# Patient Record
Sex: MALE | Race: OTHER | NOT HISPANIC OR LATINO
[De-identification: names, ages, dates, MRNs, and addresses within clinical notes are randomized per-mention and may not be internally consistent; named-entity substitution may affect disease eponyms.]

---

## 2017-04-05 ENCOUNTER — APPOINTMENT (OUTPATIENT)
Dept: SURGERY | Facility: CLINIC | Age: 52
End: 2017-04-05

## 2017-04-05 VITALS
OXYGEN SATURATION: 98 % | TEMPERATURE: 98 F | HEART RATE: 81 BPM | SYSTOLIC BLOOD PRESSURE: 113 MMHG | DIASTOLIC BLOOD PRESSURE: 72 MMHG | RESPIRATION RATE: 15 BRPM

## 2017-04-05 DIAGNOSIS — K64.8 OTHER HEMORRHOIDS: ICD-10-CM

## 2017-04-05 DIAGNOSIS — K62.89 OTHER SPECIFIED DISEASES OF ANUS AND RECTUM: ICD-10-CM

## 2017-04-07 ENCOUNTER — RESULT REVIEW (OUTPATIENT)
Age: 52
End: 2017-04-07

## 2017-04-08 ENCOUNTER — EMERGENCY (EMERGENCY)
Facility: HOSPITAL | Age: 52
LOS: 1 days | Discharge: HOME | End: 2017-04-08
Attending: EMERGENCY MEDICINE | Admitting: EMERGENCY MEDICINE
Payer: COMMERCIAL

## 2017-04-08 VITALS
TEMPERATURE: 98 F | HEART RATE: 58 BPM | SYSTOLIC BLOOD PRESSURE: 160 MMHG | OXYGEN SATURATION: 100 % | DIASTOLIC BLOOD PRESSURE: 77 MMHG | RESPIRATION RATE: 18 BRPM

## 2017-04-08 VITALS
OXYGEN SATURATION: 100 % | DIASTOLIC BLOOD PRESSURE: 54 MMHG | HEART RATE: 75 BPM | SYSTOLIC BLOOD PRESSURE: 113 MMHG | RESPIRATION RATE: 18 BRPM

## 2017-04-08 LAB
ALBUMIN SERPL ELPH-MCNC: 4.5 G/DL — SIGNIFICANT CHANGE UP (ref 3.3–5)
ALP SERPL-CCNC: 67 U/L — SIGNIFICANT CHANGE UP (ref 40–120)
ALT FLD-CCNC: 13 U/L — SIGNIFICANT CHANGE UP (ref 4–41)
APPEARANCE UR: CLEAR — SIGNIFICANT CHANGE UP
AST SERPL-CCNC: 17 U/L — SIGNIFICANT CHANGE UP (ref 4–40)
BASE EXCESS BLDV CALC-SCNC: 7.3 MMOL/L — SIGNIFICANT CHANGE UP
BILIRUB SERPL-MCNC: 0.4 MG/DL — SIGNIFICANT CHANGE UP (ref 0.2–1.2)
BILIRUB UR-MCNC: NEGATIVE — SIGNIFICANT CHANGE UP
BLOOD GAS VENOUS - CREATININE: 1.38 MG/DL — HIGH (ref 0.5–1.3)
BLOOD UR QL VISUAL: HIGH
BUN SERPL-MCNC: 19 MG/DL — SIGNIFICANT CHANGE UP (ref 7–23)
BUN SERPL-MCNC: 21 MG/DL — SIGNIFICANT CHANGE UP (ref 7–23)
CALCIUM SERPL-MCNC: 7.8 MG/DL — LOW (ref 8.4–10.5)
CALCIUM SERPL-MCNC: 9.6 MG/DL — SIGNIFICANT CHANGE UP (ref 8.4–10.5)
CHLORIDE BLDV-SCNC: 110 MMOL/L — HIGH (ref 96–108)
CHLORIDE SERPL-SCNC: 102 MMOL/L — SIGNIFICANT CHANGE UP (ref 98–107)
CHLORIDE SERPL-SCNC: 108 MMOL/L — HIGH (ref 98–107)
CO2 SERPL-SCNC: 22 MMOL/L — SIGNIFICANT CHANGE UP (ref 22–31)
CO2 SERPL-SCNC: 28 MMOL/L — SIGNIFICANT CHANGE UP (ref 22–31)
COLOR SPEC: SIGNIFICANT CHANGE UP
CREAT SERPL-MCNC: 1.23 MG/DL — SIGNIFICANT CHANGE UP (ref 0.5–1.3)
CREAT SERPL-MCNC: 1.47 MG/DL — HIGH (ref 0.5–1.3)
GAS PNL BLDV: 137 MMOL/L — SIGNIFICANT CHANGE UP (ref 136–146)
GLUCOSE BLDV-MCNC: 112 — HIGH (ref 70–99)
GLUCOSE SERPL-MCNC: 113 MG/DL — HIGH (ref 70–99)
GLUCOSE SERPL-MCNC: 118 MG/DL — HIGH (ref 70–99)
GLUCOSE UR-MCNC: NEGATIVE — SIGNIFICANT CHANGE UP
HCO3 BLDV-SCNC: 29 MMOL/L — HIGH (ref 20–27)
HCT VFR BLD CALC: 45.5 % — SIGNIFICANT CHANGE UP (ref 39–50)
HCT VFR BLDV CALC: 49 % — SIGNIFICANT CHANGE UP (ref 39–51)
HGB BLD-MCNC: 15.6 G/DL — SIGNIFICANT CHANGE UP (ref 13–17)
HGB BLDV-MCNC: 16 G/DL — SIGNIFICANT CHANGE UP (ref 13–17)
KETONES UR-MCNC: SIGNIFICANT CHANGE UP
LACTATE BLDV-MCNC: 1.9 MMOL/L — SIGNIFICANT CHANGE UP (ref 0.5–2)
LEUKOCYTE ESTERASE UR-ACNC: NEGATIVE — SIGNIFICANT CHANGE UP
MCHC RBC-ENTMCNC: 30.2 PG — SIGNIFICANT CHANGE UP (ref 27–34)
MCHC RBC-ENTMCNC: 34.3 % — SIGNIFICANT CHANGE UP (ref 32–36)
MCV RBC AUTO: 88 FL — SIGNIFICANT CHANGE UP (ref 80–100)
MUCOUS THREADS # UR AUTO: SIGNIFICANT CHANGE UP
NITRITE UR-MCNC: NEGATIVE — SIGNIFICANT CHANGE UP
PCO2 BLDV: 54 MMHG — HIGH (ref 41–51)
PH BLDV: 7.39 PH — SIGNIFICANT CHANGE UP (ref 7.32–7.43)
PH UR: 7 — SIGNIFICANT CHANGE UP (ref 4.6–8)
PLATELET # BLD AUTO: 176 K/UL — SIGNIFICANT CHANGE UP (ref 150–400)
PMV BLD: 11.5 FL — SIGNIFICANT CHANGE UP (ref 7–13)
PO2 BLDV: 31 MMHG — LOW (ref 35–40)
POTASSIUM BLDV-SCNC: 3.5 MMOL/L — SIGNIFICANT CHANGE UP (ref 3.4–4.5)
POTASSIUM SERPL-MCNC: 4 MMOL/L — SIGNIFICANT CHANGE UP (ref 3.5–5.3)
POTASSIUM SERPL-MCNC: 4.4 MMOL/L — SIGNIFICANT CHANGE UP (ref 3.5–5.3)
POTASSIUM SERPL-SCNC: 4 MMOL/L — SIGNIFICANT CHANGE UP (ref 3.5–5.3)
POTASSIUM SERPL-SCNC: 4.4 MMOL/L — SIGNIFICANT CHANGE UP (ref 3.5–5.3)
PROT SERPL-MCNC: 7.3 G/DL — SIGNIFICANT CHANGE UP (ref 6–8.3)
PROT UR-MCNC: NEGATIVE — SIGNIFICANT CHANGE UP
RBC # BLD: 5.17 M/UL — SIGNIFICANT CHANGE UP (ref 4.2–5.8)
RBC # FLD: 12.2 % — SIGNIFICANT CHANGE UP (ref 10.3–14.5)
RBC CASTS # UR COMP ASSIST: >50 — HIGH (ref 0–?)
SAO2 % BLDV: 58.2 % — LOW (ref 60–85)
SODIUM SERPL-SCNC: 143 MMOL/L — SIGNIFICANT CHANGE UP (ref 135–145)
SODIUM SERPL-SCNC: 145 MMOL/L — SIGNIFICANT CHANGE UP (ref 135–145)
SP GR SPEC: 1.02 — SIGNIFICANT CHANGE UP (ref 1–1.03)
SQUAMOUS # UR AUTO: SIGNIFICANT CHANGE UP
UROBILINOGEN FLD QL: NORMAL E.U. — SIGNIFICANT CHANGE UP (ref 0.1–0.2)
WBC # BLD: 11.51 K/UL — HIGH (ref 3.8–10.5)
WBC # FLD AUTO: 11.51 K/UL — HIGH (ref 3.8–10.5)
WBC UR QL: SIGNIFICANT CHANGE UP (ref 0–?)

## 2017-04-08 PROCEDURE — 99284 EMERGENCY DEPT VISIT MOD MDM: CPT

## 2017-04-08 PROCEDURE — 88300 SURGICAL PATH GROSS: CPT | Mod: 26

## 2017-04-08 PROCEDURE — 74176 CT ABD & PELVIS W/O CONTRAST: CPT | Mod: 26,GC

## 2017-04-08 RX ORDER — HYDROMORPHONE HYDROCHLORIDE 2 MG/ML
1 INJECTION INTRAMUSCULAR; INTRAVENOUS; SUBCUTANEOUS ONCE
Qty: 0 | Refills: 0 | Status: DISCONTINUED | OUTPATIENT
Start: 2017-04-08 | End: 2017-04-08

## 2017-04-08 RX ORDER — ONDANSETRON 8 MG/1
4 TABLET, FILM COATED ORAL ONCE
Qty: 0 | Refills: 0 | Status: COMPLETED | OUTPATIENT
Start: 2017-04-08 | End: 2017-04-08

## 2017-04-08 RX ORDER — SODIUM CHLORIDE 9 MG/ML
1000 INJECTION INTRAMUSCULAR; INTRAVENOUS; SUBCUTANEOUS ONCE
Qty: 0 | Refills: 0 | Status: COMPLETED | OUTPATIENT
Start: 2017-04-08 | End: 2017-04-08

## 2017-04-08 RX ORDER — TAMSULOSIN HYDROCHLORIDE 0.4 MG/1
0.4 CAPSULE ORAL ONCE
Qty: 0 | Refills: 0 | Status: COMPLETED | OUTPATIENT
Start: 2017-04-08 | End: 2017-04-08

## 2017-04-08 RX ORDER — KETOROLAC TROMETHAMINE 30 MG/ML
30 SYRINGE (ML) INJECTION ONCE
Qty: 0 | Refills: 0 | Status: DISCONTINUED | OUTPATIENT
Start: 2017-04-08 | End: 2017-04-08

## 2017-04-08 RX ORDER — MORPHINE SULFATE 50 MG/1
2 CAPSULE, EXTENDED RELEASE ORAL ONCE
Qty: 0 | Refills: 0 | Status: DISCONTINUED | OUTPATIENT
Start: 2017-04-08 | End: 2017-04-08

## 2017-04-08 RX ADMIN — TAMSULOSIN HYDROCHLORIDE 0.4 MILLIGRAM(S): 0.4 CAPSULE ORAL at 19:18

## 2017-04-08 RX ADMIN — ONDANSETRON 4 MILLIGRAM(S): 8 TABLET, FILM COATED ORAL at 16:14

## 2017-04-08 RX ADMIN — MORPHINE SULFATE 2 MILLIGRAM(S): 50 CAPSULE, EXTENDED RELEASE ORAL at 17:36

## 2017-04-08 RX ADMIN — MORPHINE SULFATE 2 MILLIGRAM(S): 50 CAPSULE, EXTENDED RELEASE ORAL at 17:55

## 2017-04-08 RX ADMIN — HYDROMORPHONE HYDROCHLORIDE 1 MILLIGRAM(S): 2 INJECTION INTRAMUSCULAR; INTRAVENOUS; SUBCUTANEOUS at 18:30

## 2017-04-08 RX ADMIN — Medication 30 MILLIGRAM(S): at 16:35

## 2017-04-08 RX ADMIN — Medication 30 MILLIGRAM(S): at 16:14

## 2017-04-08 RX ADMIN — SODIUM CHLORIDE 2000 MILLILITER(S): 9 INJECTION INTRAMUSCULAR; INTRAVENOUS; SUBCUTANEOUS at 16:14

## 2017-04-08 RX ADMIN — SODIUM CHLORIDE 2000 MILLILITER(S): 9 INJECTION INTRAMUSCULAR; INTRAVENOUS; SUBCUTANEOUS at 18:30

## 2017-04-08 RX ADMIN — HYDROMORPHONE HYDROCHLORIDE 1 MILLIGRAM(S): 2 INJECTION INTRAMUSCULAR; INTRAVENOUS; SUBCUTANEOUS at 18:10

## 2017-04-08 NOTE — ED PROVIDER NOTE - MEDICAL DECISION MAKING DETAILS
Pt with acute onset R flank pain radiating to groin, suspect nephrolithiasis, will give toradol, zofran, check CBC, BMP, UA, CT abd renal stone protocol, IVF

## 2017-04-08 NOTE — ED PROVIDER NOTE - OBJECTIVE STATEMENT
53 yo M hx nephrolithiasis, BPH, p/w acute onset R sided flank pain radiating to groin. Has associated sxs of nausea 51 yo M hx nephrolithiasis, BPH, p/w acute onset R sided flank pain radiating to groin. 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 Has associated sxs of nausea, but no vomiting. Symptoms feel similar to his prior episodes of nephrolithiasis. No fevers/chills. No dysuria.

## 2017-04-08 NOTE — ED PROVIDER NOTE - PROGRESS NOTE DETAILS
Pt passed kidney stone, feeling better, will d/c home with outpatient f/u with Urologist pt asking to be discharged. Does not want any meds for home. no pain now. no nausea/vomit. does not want to wait for repeat BMP will follow up himself. pt will see his urologist this week. pt pain free now.

## 2017-04-08 NOTE — ED PROVIDER NOTE - ATTENDING CONTRIBUTION TO CARE
DR Bloch- Patient c/o a few days of right groin pain, c/o severe right flank pain and nausea, similar to prior stones. Well appearing , NAD HEENT nml neck supple mod distress, mild tight lower abd pain, no hernia palpated, testiculr exam supervise by DR Lim- normal lie initially mild tenderness, but then no tenderness,

## 2017-05-03 LAB — SURGICAL PATHOLOGY STUDY: SIGNIFICANT CHANGE UP

## 2017-06-01 ENCOUNTER — FORM ENCOUNTER (OUTPATIENT)
Age: 52
End: 2017-06-01

## 2017-06-02 ENCOUNTER — OUTPATIENT (OUTPATIENT)
Dept: OUTPATIENT SERVICES | Facility: HOSPITAL | Age: 52
LOS: 1 days | End: 2017-06-02
Payer: COMMERCIAL

## 2017-06-02 ENCOUNTER — APPOINTMENT (OUTPATIENT)
Dept: ULTRASOUND IMAGING | Facility: CLINIC | Age: 52
End: 2017-06-02

## 2017-06-02 DIAGNOSIS — N50.819 TESTICULAR PAIN, UNSPECIFIED: ICD-10-CM

## 2017-06-02 PROCEDURE — 76870 US EXAM SCROTUM: CPT

## 2017-12-04 ENCOUNTER — APPOINTMENT (OUTPATIENT)
Dept: INFECTIOUS DISEASE | Facility: CLINIC | Age: 52
End: 2017-12-04
Payer: COMMERCIAL

## 2017-12-04 VITALS
RESPIRATION RATE: 18 BRPM | BODY MASS INDEX: 22.43 KG/M2 | HEIGHT: 68 IN | SYSTOLIC BLOOD PRESSURE: 136 MMHG | WEIGHT: 148 LBS | TEMPERATURE: 98.4 F | OXYGEN SATURATION: 100 % | DIASTOLIC BLOOD PRESSURE: 81 MMHG | HEART RATE: 100 BPM

## 2017-12-04 DIAGNOSIS — R76.11 NONSPECIFIC REACTION TO TUBERCULIN SKIN TEST W/OUT ACTIVE TUBERCULOSIS: ICD-10-CM

## 2017-12-04 PROCEDURE — 99203 OFFICE O/P NEW LOW 30 MIN: CPT

## 2017-12-08 ENCOUNTER — APPOINTMENT (OUTPATIENT)
Dept: INFECTIOUS DISEASE | Facility: CLINIC | Age: 52
End: 2017-12-08

## 2017-12-15 ENCOUNTER — APPOINTMENT (OUTPATIENT)
Dept: INFECTIOUS DISEASE | Facility: CLINIC | Age: 52
End: 2017-12-15

## 2017-12-22 ENCOUNTER — APPOINTMENT (OUTPATIENT)
Dept: INFECTIOUS DISEASE | Facility: CLINIC | Age: 52
End: 2017-12-22

## 2017-12-29 ENCOUNTER — APPOINTMENT (OUTPATIENT)
Dept: INFECTIOUS DISEASE | Facility: CLINIC | Age: 52
End: 2017-12-29

## 2018-01-02 ENCOUNTER — APPOINTMENT (OUTPATIENT)
Dept: INFECTIOUS DISEASE | Facility: CLINIC | Age: 53
End: 2018-01-02
Payer: COMMERCIAL

## 2018-01-02 VITALS
WEIGHT: 147 LBS | SYSTOLIC BLOOD PRESSURE: 124 MMHG | DIASTOLIC BLOOD PRESSURE: 69 MMHG | BODY MASS INDEX: 22.28 KG/M2 | RESPIRATION RATE: 16 BRPM | HEART RATE: 72 BPM | TEMPERATURE: 98 F | HEIGHT: 68 IN | OXYGEN SATURATION: 100 %

## 2018-01-02 LAB
BASOPHILS # BLD AUTO: 0.07 K/UL
BASOPHILS NFR BLD AUTO: 1.1 %
EOSINOPHIL # BLD AUTO: 0.27 K/UL
EOSINOPHIL NFR BLD AUTO: 4.2 %
HCT VFR BLD CALC: 43.1 %
HGB BLD-MCNC: 14.4 G/DL
IMM GRANULOCYTES NFR BLD AUTO: 0.2 %
LYMPHOCYTES # BLD AUTO: 2.96 K/UL
LYMPHOCYTES NFR BLD AUTO: 46.3 %
MAN DIFF?: NORMAL
MCHC RBC-ENTMCNC: 29.8 PG
MCHC RBC-ENTMCNC: 33.4 GM/DL
MCV RBC AUTO: 89 FL
MONOCYTES # BLD AUTO: 0.62 K/UL
MONOCYTES NFR BLD AUTO: 9.7 %
NEUTROPHILS # BLD AUTO: 2.47 K/UL
NEUTROPHILS NFR BLD AUTO: 38.5 %
PLATELET # BLD AUTO: 210 K/UL
RBC # BLD: 4.84 M/UL
RBC # FLD: 12.9 %
WBC # FLD AUTO: 6.4 K/UL

## 2018-01-02 PROCEDURE — 99213 OFFICE O/P EST LOW 20 MIN: CPT

## 2018-01-05 ENCOUNTER — APPOINTMENT (OUTPATIENT)
Dept: INFECTIOUS DISEASE | Facility: CLINIC | Age: 53
End: 2018-01-05

## 2018-01-08 LAB
ALBUMIN SERPL ELPH-MCNC: 3.9 G/DL
ALP BLD-CCNC: 49 U/L
ALT SERPL-CCNC: 29 U/L
ANION GAP SERPL CALC-SCNC: 12 MMOL/L
AST SERPL-CCNC: 23 U/L
BILIRUB SERPL-MCNC: 0.4 MG/DL
BUN SERPL-MCNC: 18 MG/DL
CALCIUM SERPL-MCNC: 9.3 MG/DL
CHLORIDE SERPL-SCNC: 105 MMOL/L
CO2 SERPL-SCNC: 26 MMOL/L
CREAT SERPL-MCNC: 1.04 MG/DL
GLUCOSE SERPL-MCNC: 116 MG/DL
POTASSIUM SERPL-SCNC: 4.2 MMOL/L
PROT SERPL-MCNC: 6.8 G/DL
SODIUM SERPL-SCNC: 143 MMOL/L

## 2018-01-12 ENCOUNTER — APPOINTMENT (OUTPATIENT)
Dept: INFECTIOUS DISEASE | Facility: CLINIC | Age: 53
End: 2018-01-12

## 2018-01-19 ENCOUNTER — APPOINTMENT (OUTPATIENT)
Dept: INFECTIOUS DISEASE | Facility: CLINIC | Age: 53
End: 2018-01-19

## 2018-01-26 ENCOUNTER — APPOINTMENT (OUTPATIENT)
Dept: INFECTIOUS DISEASE | Facility: CLINIC | Age: 53
End: 2018-01-26

## 2018-02-02 ENCOUNTER — APPOINTMENT (OUTPATIENT)
Dept: INFECTIOUS DISEASE | Facility: CLINIC | Age: 53
End: 2018-02-02

## 2018-02-06 ENCOUNTER — APPOINTMENT (OUTPATIENT)
Dept: INFECTIOUS DISEASE | Facility: CLINIC | Age: 53
End: 2018-02-06
Payer: COMMERCIAL

## 2018-02-06 VITALS
DIASTOLIC BLOOD PRESSURE: 65 MMHG | HEART RATE: 66 BPM | TEMPERATURE: 97.8 F | WEIGHT: 147 LBS | SYSTOLIC BLOOD PRESSURE: 113 MMHG | OXYGEN SATURATION: 100 % | BODY MASS INDEX: 22.35 KG/M2

## 2018-02-06 PROCEDURE — 99213 OFFICE O/P EST LOW 20 MIN: CPT

## 2018-02-06 RX ORDER — ISONIAZID 300 MG/1
300 TABLET ORAL
Qty: 12 | Refills: 2 | Status: DISCONTINUED | COMMUNITY
Start: 2017-12-04 | End: 2018-02-06

## 2018-02-06 RX ORDER — RIFAPENTINE 150 MG/1
150 TABLET, FILM COATED ORAL
Qty: 24 | Refills: 2 | Status: DISCONTINUED | COMMUNITY
Start: 2017-12-04 | End: 2018-02-06

## 2018-02-09 ENCOUNTER — APPOINTMENT (OUTPATIENT)
Dept: INFECTIOUS DISEASE | Facility: CLINIC | Age: 53
End: 2018-02-09

## 2018-02-16 ENCOUNTER — APPOINTMENT (OUTPATIENT)
Dept: INFECTIOUS DISEASE | Facility: CLINIC | Age: 53
End: 2018-02-16

## 2018-02-23 ENCOUNTER — APPOINTMENT (OUTPATIENT)
Dept: INFECTIOUS DISEASE | Facility: CLINIC | Age: 53
End: 2018-02-23

## 2018-02-26 LAB
ALBUMIN SERPL ELPH-MCNC: 4.1 G/DL
ALP BLD-CCNC: 59 U/L
ALT SERPL-CCNC: 26 U/L
ANION GAP SERPL CALC-SCNC: 11 MMOL/L
AST SERPL-CCNC: 26 U/L
BASOPHILS # BLD AUTO: 0.06 K/UL
BASOPHILS NFR BLD AUTO: 1 %
BILIRUB SERPL-MCNC: 0.4 MG/DL
BUN SERPL-MCNC: 22 MG/DL
CALCIUM SERPL-MCNC: 9.7 MG/DL
CHLORIDE SERPL-SCNC: 105 MMOL/L
CO2 SERPL-SCNC: 26 MMOL/L
CREAT SERPL-MCNC: 1.01 MG/DL
EOSINOPHIL # BLD AUTO: 0.32 K/UL
EOSINOPHIL NFR BLD AUTO: 5.5 %
GLUCOSE SERPL-MCNC: 124 MG/DL
HCT VFR BLD CALC: 43 %
HGB BLD-MCNC: 14.7 G/DL
IMM GRANULOCYTES NFR BLD AUTO: 0 %
LYMPHOCYTES # BLD AUTO: 2.47 K/UL
LYMPHOCYTES NFR BLD AUTO: 42.1 %
MAN DIFF?: NORMAL
MCHC RBC-ENTMCNC: 30.2 PG
MCHC RBC-ENTMCNC: 34.2 GM/DL
MCV RBC AUTO: 88.5 FL
MONOCYTES # BLD AUTO: 0.41 K/UL
MONOCYTES NFR BLD AUTO: 7 %
NEUTROPHILS # BLD AUTO: 2.61 K/UL
NEUTROPHILS NFR BLD AUTO: 44.4 %
PLATELET # BLD AUTO: 164 K/UL
POTASSIUM SERPL-SCNC: 4.5 MMOL/L
PROT SERPL-MCNC: 6.7 G/DL
RBC # BLD: 4.86 M/UL
RBC # FLD: 13 %
SODIUM SERPL-SCNC: 142 MMOL/L
WBC # FLD AUTO: 5.87 K/UL

## 2018-03-09 ENCOUNTER — APPOINTMENT (OUTPATIENT)
Dept: INFECTIOUS DISEASE | Facility: CLINIC | Age: 53
End: 2018-03-09
Payer: COMMERCIAL

## 2018-03-09 VITALS
HEIGHT: 68 IN | TEMPERATURE: 97.4 F | OXYGEN SATURATION: 100 % | SYSTOLIC BLOOD PRESSURE: 117 MMHG | HEART RATE: 64 BPM | WEIGHT: 149 LBS | BODY MASS INDEX: 22.58 KG/M2 | DIASTOLIC BLOOD PRESSURE: 65 MMHG

## 2018-03-09 PROCEDURE — 99213 OFFICE O/P EST LOW 20 MIN: CPT

## 2018-04-13 ENCOUNTER — APPOINTMENT (OUTPATIENT)
Dept: INFECTIOUS DISEASE | Facility: CLINIC | Age: 53
End: 2018-04-13
Payer: COMMERCIAL

## 2018-04-13 VITALS
TEMPERATURE: 97.7 F | WEIGHT: 149 LBS | RESPIRATION RATE: 18 BRPM | BODY MASS INDEX: 22.58 KG/M2 | SYSTOLIC BLOOD PRESSURE: 114 MMHG | DIASTOLIC BLOOD PRESSURE: 69 MMHG | HEART RATE: 66 BPM | OXYGEN SATURATION: 97 % | HEIGHT: 68 IN

## 2018-04-13 PROCEDURE — 99213 OFFICE O/P EST LOW 20 MIN: CPT

## 2018-04-16 ENCOUNTER — MOBILE ON CALL (OUTPATIENT)
Age: 53
End: 2018-04-16

## 2018-04-16 LAB
ALBUMIN SERPL ELPH-MCNC: 4.2 G/DL
ALP BLD-CCNC: 65 U/L
ALT SERPL-CCNC: 13 U/L
ANION GAP SERPL CALC-SCNC: 12 MMOL/L
AST SERPL-CCNC: 15 U/L
BASOPHILS # BLD AUTO: 0.05 K/UL
BASOPHILS NFR BLD AUTO: 0.8 %
BILIRUB SERPL-MCNC: 0.3 MG/DL
BUN SERPL-MCNC: 19 MG/DL
CALCIUM SERPL-MCNC: 9.4 MG/DL
CHLORIDE SERPL-SCNC: 102 MMOL/L
CO2 SERPL-SCNC: 28 MMOL/L
CREAT SERPL-MCNC: 1.15 MG/DL
EOSINOPHIL # BLD AUTO: 0.36 K/UL
EOSINOPHIL NFR BLD AUTO: 5.7 %
GLUCOSE SERPL-MCNC: 97 MG/DL
HCT VFR BLD CALC: 43.5 %
HGB BLD-MCNC: 14.8 G/DL
IMM GRANULOCYTES NFR BLD AUTO: 0.2 %
LYMPHOCYTES # BLD AUTO: 2.8 K/UL
LYMPHOCYTES NFR BLD AUTO: 44.2 %
MAN DIFF?: NORMAL
MCHC RBC-ENTMCNC: 30.1 PG
MCHC RBC-ENTMCNC: 34 GM/DL
MCV RBC AUTO: 88.6 FL
MONOCYTES # BLD AUTO: 0.32 K/UL
MONOCYTES NFR BLD AUTO: 5.1 %
NEUTROPHILS # BLD AUTO: 2.79 K/UL
NEUTROPHILS NFR BLD AUTO: 44 %
PLATELET # BLD AUTO: 145 K/UL
POTASSIUM SERPL-SCNC: 4.4 MMOL/L
PROT SERPL-MCNC: 6.8 G/DL
RBC # BLD: 4.91 M/UL
RBC # FLD: 13.1 %
SODIUM SERPL-SCNC: 142 MMOL/L
WBC # FLD AUTO: 6.33 K/UL

## 2018-04-20 ENCOUNTER — APPOINTMENT (OUTPATIENT)
Dept: INFECTIOUS DISEASE | Facility: CLINIC | Age: 53
End: 2018-04-20

## 2018-04-20 LAB
BASOPHILS # BLD AUTO: 0.04 K/UL
BASOPHILS NFR BLD AUTO: 0.7 %
EOSINOPHIL # BLD AUTO: 0.4 K/UL
EOSINOPHIL NFR BLD AUTO: 6.6 %
HCT VFR BLD CALC: 44.8 %
HGB BLD-MCNC: 15.5 G/DL
IMM GRANULOCYTES NFR BLD AUTO: 0.2 %
LYMPHOCYTES # BLD AUTO: 2.68 K/UL
LYMPHOCYTES NFR BLD AUTO: 44.5 %
MAN DIFF?: NORMAL
MCHC RBC-ENTMCNC: 30.5 PG
MCHC RBC-ENTMCNC: 34.6 GM/DL
MCV RBC AUTO: 88.2 FL
MONOCYTES # BLD AUTO: 0.4 K/UL
MONOCYTES NFR BLD AUTO: 6.6 %
NEUTROPHILS # BLD AUTO: 2.49 K/UL
NEUTROPHILS NFR BLD AUTO: 41.4 %
PLATELET # BLD AUTO: 150 K/UL
RBC # BLD: 5.08 M/UL
RBC # FLD: 12.9 %
WBC # FLD AUTO: 6.02 K/UL

## 2018-05-10 ENCOUNTER — OUTPATIENT (OUTPATIENT)
Dept: OUTPATIENT SERVICES | Facility: HOSPITAL | Age: 53
LOS: 1 days | End: 2018-05-10
Payer: COMMERCIAL

## 2018-05-10 ENCOUNTER — APPOINTMENT (OUTPATIENT)
Dept: ULTRASOUND IMAGING | Facility: CLINIC | Age: 53
End: 2018-05-10
Payer: COMMERCIAL

## 2018-05-10 DIAGNOSIS — Z00.8 ENCOUNTER FOR OTHER GENERAL EXAMINATION: ICD-10-CM

## 2018-05-10 PROCEDURE — 76775 US EXAM ABDO BACK WALL LIM: CPT | Mod: 26

## 2018-05-10 PROCEDURE — 76870 US EXAM SCROTUM: CPT

## 2018-05-10 PROCEDURE — 76870 US EXAM SCROTUM: CPT | Mod: 26

## 2018-05-10 PROCEDURE — 76775 US EXAM ABDO BACK WALL LIM: CPT

## 2018-05-22 ENCOUNTER — APPOINTMENT (OUTPATIENT)
Dept: INFECTIOUS DISEASE | Facility: CLINIC | Age: 53
End: 2018-05-22
Payer: COMMERCIAL

## 2018-05-22 PROCEDURE — 99213 OFFICE O/P EST LOW 20 MIN: CPT

## 2018-06-08 ENCOUNTER — APPOINTMENT (OUTPATIENT)
Dept: INFECTIOUS DISEASE | Facility: CLINIC | Age: 53
End: 2018-06-08
Payer: COMMERCIAL

## 2018-06-08 VITALS
TEMPERATURE: 97.9 F | SYSTOLIC BLOOD PRESSURE: 123 MMHG | OXYGEN SATURATION: 99 % | WEIGHT: 146 LBS | BODY MASS INDEX: 22.13 KG/M2 | HEART RATE: 80 BPM | DIASTOLIC BLOOD PRESSURE: 74 MMHG | RESPIRATION RATE: 18 BRPM | HEIGHT: 68 IN

## 2018-06-08 LAB
BASOPHILS # BLD AUTO: 0.07 K/UL
BASOPHILS NFR BLD AUTO: 1.1 %
EOSINOPHIL # BLD AUTO: 0.3 K/UL
EOSINOPHIL NFR BLD AUTO: 4.7 %
HCT VFR BLD CALC: 46.2 %
HGB BLD-MCNC: 15.5 G/DL
IMM GRANULOCYTES NFR BLD AUTO: 0.2 %
LYMPHOCYTES # BLD AUTO: 2.7 K/UL
LYMPHOCYTES NFR BLD AUTO: 42.7 %
MAN DIFF?: NORMAL
MCHC RBC-ENTMCNC: 30.4 PG
MCHC RBC-ENTMCNC: 33.5 GM/DL
MCV RBC AUTO: 90.6 FL
MONOCYTES # BLD AUTO: 0.33 K/UL
MONOCYTES NFR BLD AUTO: 5.2 %
NEUTROPHILS # BLD AUTO: 2.91 K/UL
NEUTROPHILS NFR BLD AUTO: 46.1 %
PLATELET # BLD AUTO: 160 K/UL
RBC # BLD: 5.1 M/UL
RBC # FLD: 12.5 %
WBC # FLD AUTO: 6.32 K/UL

## 2018-06-08 PROCEDURE — 99213 OFFICE O/P EST LOW 20 MIN: CPT

## 2018-06-11 LAB
ALBUMIN SERPL ELPH-MCNC: 4.3 G/DL
ALP BLD-CCNC: 60 U/L
ALT SERPL-CCNC: 11 U/L
ANION GAP SERPL CALC-SCNC: 12 MMOL/L
AST SERPL-CCNC: 15 U/L
BILIRUB SERPL-MCNC: 0.4 MG/DL
BUN SERPL-MCNC: 21 MG/DL
CALCIUM SERPL-MCNC: 9.4 MG/DL
CHLORIDE SERPL-SCNC: 103 MMOL/L
CO2 SERPL-SCNC: 28 MMOL/L
CREAT SERPL-MCNC: 1.07 MG/DL
GLUCOSE SERPL-MCNC: 125 MG/DL
POTASSIUM SERPL-SCNC: 4.4 MMOL/L
PROT SERPL-MCNC: 7 G/DL
SODIUM SERPL-SCNC: 143 MMOL/L

## 2018-06-19 ENCOUNTER — RX RENEWAL (OUTPATIENT)
Age: 53
End: 2018-06-19

## 2019-06-07 PROBLEM — N20.0 CALCULUS OF KIDNEY: Chronic | Status: ACTIVE | Noted: 2017-04-08

## 2019-06-07 PROBLEM — N40.0 BENIGN PROSTATIC HYPERPLASIA WITHOUT LOWER URINARY TRACT SYMPTOMS: Chronic | Status: ACTIVE | Noted: 2017-04-08

## 2019-06-14 ENCOUNTER — OUTPATIENT (OUTPATIENT)
Dept: OUTPATIENT SERVICES | Facility: HOSPITAL | Age: 54
LOS: 1 days | End: 2019-06-14
Payer: COMMERCIAL

## 2019-06-14 ENCOUNTER — APPOINTMENT (OUTPATIENT)
Dept: CT IMAGING | Facility: IMAGING CENTER | Age: 54
End: 2019-06-14
Payer: COMMERCIAL

## 2019-06-14 DIAGNOSIS — Z00.8 ENCOUNTER FOR OTHER GENERAL EXAMINATION: ICD-10-CM

## 2019-06-14 PROCEDURE — 74178 CT ABD&PLV WO CNTR FLWD CNTR: CPT | Mod: 26

## 2019-06-14 PROCEDURE — 74178 CT ABD&PLV WO CNTR FLWD CNTR: CPT

## 2019-08-09 NOTE — ED ADULT TRIAGE NOTE - SPO2 (%)
Detail Level: Detailed Quality 226: Preventive Care And Screening: Tobacco Use: Screening And Cessation Intervention: Patient screened for tobacco use and is an ex/non-smoker 100

## 2019-08-22 ENCOUNTER — APPOINTMENT (OUTPATIENT)
Dept: ORTHOPEDIC SURGERY | Facility: CLINIC | Age: 54
End: 2019-08-22
Payer: COMMERCIAL

## 2019-08-22 DIAGNOSIS — M25.511 PAIN IN RIGHT SHOULDER: ICD-10-CM

## 2019-08-22 PROCEDURE — 99203 OFFICE O/P NEW LOW 30 MIN: CPT | Mod: 25

## 2019-08-22 PROCEDURE — 73030 X-RAY EXAM OF SHOULDER: CPT | Mod: RT

## 2019-08-22 PROCEDURE — 20610 DRAIN/INJ JOINT/BURSA W/O US: CPT | Mod: RT

## 2019-08-23 PROBLEM — M25.511 ACUTE PAIN OF RIGHT SHOULDER: Status: ACTIVE | Noted: 2019-08-23

## 2019-08-23 NOTE — PROCEDURE
[de-identified] : Injection: Right shoulder (Subacromial).\par Indication: Impingement/rotator cuff dysfunction.\par \par A discussion was had with the patient regarding this procedure and all questions were answered. All risks, benefits and alternatives were discussed. These included but were not limited to bleeding, infection, and allergic reaction. Alcohol was used to clean the skin, and betadine was used to sterilize and prep the area in the posterior aspect of the right shoulder. Ethyl chloride spray was then used as a topical anesthetic. A 21-gauge needle was used to inject 4cc of 1% lidocaine and 1cc of 40mg/ml methylprednisolone into the right subacromial space. A sterile bandage was then applied. The patient tolerated the procedure well and there were no complications.

## 2019-08-23 NOTE — DISCUSSION/SUMMARY
[de-identified] : 54-year-old male with right shoulder pain\par \par Patient presents with pain to the right shoulder that may be consistent with rotator cuff dysfunction. Patient is significantly improved since his initial injury, but does have some residual rotator cuff dysfunction. Recommendation at this time would be for a home exercise program as well as injection therapy to the right subacromial space was performed today. If no significant improvement, patient may begin a formal physical therapy program upon return from vacation over the next week.\par \par If symptoms persist, would then obtain MRI imaging to confirm that there is no significant internal derangement of the right rotator cuff tendon.\par \par Patient voiced understanding.\par \par Followup 6 weeks if needed

## 2019-08-23 NOTE — PHYSICAL EXAM
[de-identified] : Oriented to time, place, person\par Mood: Normal\par Affect: Normal\par Appearance: Healthy, well appearing, no acute distress.\par Gait: Normal\par Assistive Devices: None\par \par Right shoulder exam\par \par Inspection: No malalignment, No defects, No atrophy\par Skin: No masses, No lesions\par Neck: Negative Spurlings, full ROM, no pain with ROM\par AROM: FF to 180, abduction to 90, ER to 70, IR to mid lumbar\par PROM: Full\par Painful arc ROM: Mild restriction of internal rotation\par Tenderness: Mild bicipital tenderness, mild tenderness to the greater tuberosity/RTC insertion, no anterior shoulder/lesser tuberosity tenderness\par Strength: 4+/5 ER, 5/5 IR in adduction, 4+/5 supraspinatus testing, positive O'Briens test\par AC Joint: No ttp/pain with cross arm testing\par Biceps: Speed Negative, Yergusons Negative\par Impingement test: Positive Rya, Positive Neer \par Stability: Stable\par Vasc: 2+ radial pulse\par Neuro: AIN, PIN, Ulnar nerve in tact to motor\par Sensation: In tact to light touch throughout\par \par  [de-identified] : \par The following radiographs were ordered and read by me during this patients visit. I reviewed each radiograph in detail with the patient and discussed the findings as highlighted below. \par \par 3 views of the right shoulder were obtained today that show no acute fracture or dislocation. There is no glenohumeral and no AC joint degenerative change seen. Type I acromion. There is no significant malalignment. No significant other obvious osseous abnormality, otherwise unremarkable.
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
## 2019-11-01 ENCOUNTER — APPOINTMENT (OUTPATIENT)
Dept: CT IMAGING | Facility: IMAGING CENTER | Age: 54
End: 2019-11-01
Payer: COMMERCIAL

## 2019-11-01 ENCOUNTER — OUTPATIENT (OUTPATIENT)
Dept: OUTPATIENT SERVICES | Facility: HOSPITAL | Age: 54
LOS: 1 days | End: 2019-11-01
Payer: COMMERCIAL

## 2019-11-01 DIAGNOSIS — Z00.8 ENCOUNTER FOR OTHER GENERAL EXAMINATION: ICD-10-CM

## 2019-11-01 PROCEDURE — 74177 CT ABD & PELVIS W/CONTRAST: CPT

## 2019-11-01 PROCEDURE — 74177 CT ABD & PELVIS W/CONTRAST: CPT | Mod: 26

## 2019-11-01 PROCEDURE — 71260 CT THORAX DX C+: CPT

## 2019-11-01 PROCEDURE — 71260 CT THORAX DX C+: CPT | Mod: 26

## 2020-01-14 ENCOUNTER — FORM ENCOUNTER (OUTPATIENT)
Age: 55
End: 2020-01-14

## 2020-01-15 ENCOUNTER — OUTPATIENT (OUTPATIENT)
Dept: OUTPATIENT SERVICES | Facility: HOSPITAL | Age: 55
LOS: 1 days | End: 2020-01-15
Payer: COMMERCIAL

## 2020-01-15 ENCOUNTER — APPOINTMENT (OUTPATIENT)
Dept: MRI IMAGING | Facility: CLINIC | Age: 55
End: 2020-01-15
Payer: COMMERCIAL

## 2020-01-15 DIAGNOSIS — Z00.8 ENCOUNTER FOR OTHER GENERAL EXAMINATION: ICD-10-CM

## 2020-01-15 PROCEDURE — 73221 MRI JOINT UPR EXTREM W/O DYE: CPT | Mod: 26,RT

## 2020-01-15 PROCEDURE — 73221 MRI JOINT UPR EXTREM W/O DYE: CPT

## 2020-01-27 ENCOUNTER — APPOINTMENT (OUTPATIENT)
Dept: ORTHOPEDIC SURGERY | Facility: CLINIC | Age: 55
End: 2020-01-27
Payer: COMMERCIAL

## 2020-01-27 PROCEDURE — 99214 OFFICE O/P EST MOD 30 MIN: CPT

## 2020-01-28 NOTE — PHYSICAL EXAM
[de-identified] : Oriented to time, place, person\par Mood: Normal\par Affect: Normal\par Appearance: Healthy, well appearing, no acute distress.\par Gait: Normal\par Assistive Devices: None\par \par Right shoulder exam\par \par Inspection: No malalignment, No defects, No atrophy\par Skin: No masses, No lesions\par Neck: Negative Spurlings, full ROM, no pain with ROM\par AROM: FF to 180, abduction to 90, ER to 70, IR to mid lumbar\par PROM: Full\par Painful arc ROM: Mild restriction of internal rotation\par Tenderness: Mild bicipital tenderness, mild tenderness to the greater tuberosity/RTC insertion, no anterior shoulder/lesser tuberosity tenderness\par Strength: 4+/5 ER, 5/5 IR in adduction, 4+/5 supraspinatus testing, positive O'Briens test\par AC Joint: No ttp/pain with cross arm testing\par Biceps: Speed Negative, Yergusons Negative\par Impingement test: Positive Ray, Positive Neer \par Stability: Stable\par Vasc: 2+ radial pulse\par Neuro: AIN, PIN, Ulnar nerve in tact to motor\par Sensation: In tact to light touch throughout\par \par  [de-identified] : 3 views of the right shoulder were obtained 8/22/19 that show no acute fracture or dislocation. There is no glenohumeral and no AC joint degenerative change seen. Type I acromion. There is no significant malalignment. No significant other obvious osseous abnormality, otherwise unremarkable.\par \par MRI right shoulder dated 1.15.20 shows evidence of a full-thickness tear of the supraspinatus tendon with moderate retraction to mid glenohumeral joint. Degenerative labral tearing.
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
## 2020-01-28 NOTE — ADDENDUM
[FreeTextEntry1] : This note was written by Magda Redmond on 01/27/2020 acting solely as a scribe for Dr. Yan Dinero.\par \par All medical record entries made by the Scribe were at my, Dr. Yan Dinero, direction and personally dictated by me on 01/27/2020. I have personally reviewed the chart and agree that the record accurately reflects my personal performance of the history, physical exam, assessment and plan.\par

## 2020-01-28 NOTE — HISTORY OF PRESENT ILLNESS
[de-identified] : 55 year old RHD male who presents to the office for follow up of right shoulder pain. MRI right shoulder dated 1.15.20 shows evidence of a full-thickness tear of the supraspinatus tendon with moderate retraction. Rates his pain 3/10.  Recieved an injection at the last visit which was not helpful. He tried a few sessions of physical therapy which helped his pain approximately 5% more. Residual pain does not allow him to sleep on his right side. Reports some mild radiation through the lateral aspect of the arm. Reports some weakness.

## 2020-02-21 ENCOUNTER — OUTPATIENT (OUTPATIENT)
Dept: OUTPATIENT SERVICES | Facility: HOSPITAL | Age: 55
LOS: 1 days | End: 2020-02-21

## 2020-02-21 VITALS
SYSTOLIC BLOOD PRESSURE: 110 MMHG | DIASTOLIC BLOOD PRESSURE: 60 MMHG | RESPIRATION RATE: 16 BRPM | HEART RATE: 79 BPM | HEIGHT: 67 IN | WEIGHT: 145.06 LBS | TEMPERATURE: 98 F | OXYGEN SATURATION: 99 %

## 2020-02-21 DIAGNOSIS — J45.909 UNSPECIFIED ASTHMA, UNCOMPLICATED: ICD-10-CM

## 2020-02-21 DIAGNOSIS — Z98.890 OTHER SPECIFIED POSTPROCEDURAL STATES: Chronic | ICD-10-CM

## 2020-02-21 DIAGNOSIS — M75.100 UNSPECIFIED ROTATOR CUFF TEAR OR RUPTURE OF UNSPECIFIED SHOULDER, NOT SPECIFIED AS TRAUMATIC: ICD-10-CM

## 2020-02-21 DIAGNOSIS — M75.121 COMPLETE ROTATOR CUFF TEAR OR RUPTURE OF RIGHT SHOULDER, NOT SPECIFIED AS TRAUMATIC: ICD-10-CM

## 2020-02-21 LAB
ANION GAP SERPL CALC-SCNC: 13 MMO/L — SIGNIFICANT CHANGE UP (ref 7–14)
BUN SERPL-MCNC: 17 MG/DL — SIGNIFICANT CHANGE UP (ref 7–23)
CALCIUM SERPL-MCNC: 9.6 MG/DL — SIGNIFICANT CHANGE UP (ref 8.4–10.5)
CHLORIDE SERPL-SCNC: 101 MMOL/L — SIGNIFICANT CHANGE UP (ref 98–107)
CO2 SERPL-SCNC: 28 MMOL/L — SIGNIFICANT CHANGE UP (ref 22–31)
CREAT SERPL-MCNC: 1.04 MG/DL — SIGNIFICANT CHANGE UP (ref 0.5–1.3)
GLUCOSE SERPL-MCNC: 120 MG/DL — HIGH (ref 70–99)
HCT VFR BLD CALC: 45 % — SIGNIFICANT CHANGE UP (ref 39–50)
HGB BLD-MCNC: 15.1 G/DL — SIGNIFICANT CHANGE UP (ref 13–17)
MCHC RBC-ENTMCNC: 29.7 PG — SIGNIFICANT CHANGE UP (ref 27–34)
MCHC RBC-ENTMCNC: 33.6 % — SIGNIFICANT CHANGE UP (ref 32–36)
MCV RBC AUTO: 88.4 FL — SIGNIFICANT CHANGE UP (ref 80–100)
NRBC # FLD: 0 K/UL — SIGNIFICANT CHANGE UP (ref 0–0)
PLATELET # BLD AUTO: 184 K/UL — SIGNIFICANT CHANGE UP (ref 150–400)
PMV BLD: 11.7 FL — SIGNIFICANT CHANGE UP (ref 7–13)
POTASSIUM SERPL-MCNC: 4 MMOL/L — SIGNIFICANT CHANGE UP (ref 3.5–5.3)
POTASSIUM SERPL-SCNC: 4 MMOL/L — SIGNIFICANT CHANGE UP (ref 3.5–5.3)
RBC # BLD: 5.09 M/UL — SIGNIFICANT CHANGE UP (ref 4.2–5.8)
RBC # FLD: 11.9 % — SIGNIFICANT CHANGE UP (ref 10.3–14.5)
SODIUM SERPL-SCNC: 142 MMOL/L — SIGNIFICANT CHANGE UP (ref 135–145)
WBC # BLD: 7.29 K/UL — SIGNIFICANT CHANGE UP (ref 3.8–10.5)
WBC # FLD AUTO: 7.29 K/UL — SIGNIFICANT CHANGE UP (ref 3.8–10.5)

## 2020-02-21 NOTE — H&P PST ADULT - NSICDXFAMILYHX_GEN_ALL_CORE_FT
FAMILY HISTORY:  Father  Still living? Unknown  FH: hyperlipidemia, Age at diagnosis: Age Unknown    Mother  Still living? Unknown  FH: HTN (hypertension), Age at diagnosis: Age Unknown

## 2020-02-21 NOTE — H&P PST ADULT - HISTORY OF PRESENT ILLNESS
55 year old male with c/o right shoulder pain since 7/2019, had PT but still with pain, had MRI which revealed rotator cuff tear. Pt presents today for presurgical evaluation for ... 55 year old male with c/o right shoulder pain since 7/2019, had PT but still with pain, had MRI which revealed rotator cuff tear. Pt presents today for presurgical evaluation for scheduled Right Shoulder Major Debridement, Subacromial Decompression Acromioplasty, Arthroscopic Rotator Cuff Repair.

## 2020-02-21 NOTE — H&P PST ADULT - NEGATIVE NEUROLOGICAL SYMPTOMS
no paresthesias/no generalized seizures/no headache/no weakness/no focal seizures no generalized seizures/no paresthesias/no headache/no syncope/no focal seizures

## 2020-02-21 NOTE — H&P PST ADULT - NSICDXPASTMEDICALHX_GEN_ALL_CORE_FT
PAST MEDICAL HISTORY:  Asthma     BPH (benign prostatic hyperplasia)     Hyperlipidemia diet controlled    Nephrolithiasis

## 2020-02-21 NOTE — H&P PST ADULT - NSICDXPROBLEM_GEN_ALL_CORE_FT
PROBLEM DIAGNOSES  Problem: Rotator cuff tear  Assessment and Plan: Pt scheduled for surgery on 3/4/2020.  Pre-op instructions provided. Pt verbalized understanding.   Pepcid provided for GI prophylaxis.   Pt given detailed verbal and written instructions on chlorhexidine wash. Pt verbalized understanding with teachback.   Pt is going for medical clearance per surgeon's request.     Problem: Asthma  Assessment and Plan: Pt advised to continue medication as prescribed. PROBLEM DIAGNOSES  Problem: Rotator cuff tear  Assessment and Plan: Pt scheduled for surgery on 3/4/2020.  Pre-op instructions provided. Pt verbalized understanding.   Pepcid provided for GI prophylaxis.   Pt given detailed verbal and written instructions on chlorhexidine wash. Pt verbalized understanding with teachback.   Pt is going for medical clearance per surgeon's request.   OR booking notified of Latex allergy.     Problem: Asthma  Assessment and Plan: Pt advised to continue medication as prescribed.

## 2020-02-28 PROBLEM — J45.909 UNSPECIFIED ASTHMA, UNCOMPLICATED: Chronic | Status: ACTIVE | Noted: 2020-02-21

## 2020-02-28 PROBLEM — E78.5 HYPERLIPIDEMIA, UNSPECIFIED: Chronic | Status: ACTIVE | Noted: 2020-02-21

## 2020-03-02 ENCOUNTER — APPOINTMENT (OUTPATIENT)
Dept: DERMATOLOGY | Facility: CLINIC | Age: 55
End: 2020-03-02
Payer: COMMERCIAL

## 2020-03-02 VITALS — BODY MASS INDEX: 22.28 KG/M2 | HEIGHT: 68 IN | WEIGHT: 147 LBS

## 2020-03-02 DIAGNOSIS — L30.8 OTHER SPECIFIED DERMATITIS: ICD-10-CM

## 2020-03-02 DIAGNOSIS — D18.01 HEMANGIOMA OF SKIN AND SUBCUTANEOUS TISSUE: ICD-10-CM

## 2020-03-02 PROCEDURE — 99203 OFFICE O/P NEW LOW 30 MIN: CPT

## 2020-03-03 ENCOUNTER — TRANSCRIPTION ENCOUNTER (OUTPATIENT)
Age: 55
End: 2020-03-03

## 2020-03-04 ENCOUNTER — OUTPATIENT (OUTPATIENT)
Dept: OUTPATIENT SERVICES | Facility: HOSPITAL | Age: 55
LOS: 1 days | Discharge: HOME | End: 2020-03-04
Payer: COMMERCIAL

## 2020-03-04 ENCOUNTER — APPOINTMENT (OUTPATIENT)
Dept: ORTHOPEDIC SURGERY | Facility: AMBULATORY SURGERY CENTER | Age: 55
End: 2020-03-04

## 2020-03-04 VITALS
TEMPERATURE: 98 F | WEIGHT: 145.06 LBS | HEIGHT: 67 IN | HEART RATE: 84 BPM | OXYGEN SATURATION: 100 % | DIASTOLIC BLOOD PRESSURE: 76 MMHG | SYSTOLIC BLOOD PRESSURE: 138 MMHG | RESPIRATION RATE: 16 BRPM

## 2020-03-04 VITALS
TEMPERATURE: 98 F | HEART RATE: 71 BPM | DIASTOLIC BLOOD PRESSURE: 79 MMHG | RESPIRATION RATE: 16 BRPM | OXYGEN SATURATION: 99 % | SYSTOLIC BLOOD PRESSURE: 129 MMHG

## 2020-03-04 DIAGNOSIS — M75.121 COMPLETE ROTATOR CUFF TEAR OR RUPTURE OF RIGHT SHOULDER, NOT SPECIFIED AS TRAUMATIC: ICD-10-CM

## 2020-03-04 DIAGNOSIS — Z98.890 OTHER SPECIFIED POSTPROCEDURAL STATES: Chronic | ICD-10-CM

## 2020-03-04 PROCEDURE — 29823 SHO ARTHRS SRG XTNSV DBRDMT: CPT | Mod: RT

## 2020-03-04 PROCEDURE — 29826 SHO ARTHRS SRG DECOMPRESSION: CPT | Mod: RT

## 2020-03-04 PROCEDURE — 29827 SHO ARTHRS SRG RT8TR CUF RPR: CPT | Mod: RT

## 2020-03-04 RX ORDER — ALFUZOSIN HYDROCHLORIDE 10 MG/1
1 TABLET, EXTENDED RELEASE ORAL
Qty: 0 | Refills: 0 | DISCHARGE

## 2020-03-04 RX ORDER — BUDESONIDE AND FORMOTEROL FUMARATE DIHYDRATE 160; 4.5 UG/1; UG/1
2 AEROSOL RESPIRATORY (INHALATION)
Qty: 0 | Refills: 0 | DISCHARGE

## 2020-03-04 NOTE — ASU PREOPERATIVE ASSESSMENT, ADULT (IPARK ONLY) - HARM RISK FACTORS
Jennifer Nogueira [facility]  [address] [place] [address]            9/26/2017      Dear Jennifer,     I am pleased to inform you that your recent XRAY results show ARTHRITIS OF THE LEFT HIP.  If you have any questions or concerns, please make a note of them and bring to your next appointment.     Sincerely,     Alysa Lake MD  [facility]  [address] [place] [address]  [phone]  
no

## 2020-03-04 NOTE — ASU DISCHARGE PLAN (ADULT/PEDIATRIC) - PATIENT EDUCATION MATERIALS PROVIED
Pre-printed instructions given for other (specify)/Supplemental education sheets (2+)  provided to help with your recovery from surgery and anesthesia/Provider pre-printed instructions given

## 2020-03-04 NOTE — ASU DISCHARGE PLAN (ADULT/PEDIATRIC) - CALL YOUR DOCTOR IF YOU HAVE ANY OF THE FOLLOWING:
Fever greater than (need to indicate Fahrenheit or Celsius)/Numbness, tingling, color or temperature change to extremity/Bleeding that does not stop/Wound/Surgical Site with redness, or foul smelling discharge or pus/Swelling that gets worse/Pain not relieved by Medications

## 2020-03-04 NOTE — ASU DISCHARGE PLAN (ADULT/PEDIATRIC) - NURSING INSTRUCTIONS
DO NOT take any Tylenol (Acetaminophen) or narcotics containing Tylenol until after  __7:30PM____ . You received Tylenol 1000mg during your operation and it can cause damage to your liver if too much is taken within a 24 hour time period.   Please consult with your MD if you have any medical history or condition which may alter this general recommendation.

## 2020-03-12 ENCOUNTER — APPOINTMENT (OUTPATIENT)
Dept: ORTHOPEDIC SURGERY | Facility: CLINIC | Age: 55
End: 2020-03-12
Payer: COMMERCIAL

## 2020-03-12 PROCEDURE — 99024 POSTOP FOLLOW-UP VISIT: CPT

## 2020-03-12 NOTE — ADDENDUM
[FreeTextEntry1] : This note was written by Magda Redmond on 03/12/2020 acting solely as a scribe for Dr. Yan Dinero.\par \par All medical record entries made by the Scribe were at my, Dr. Yan Dinero, direction and personally dictated by me on 03/12/2020. I have personally reviewed the chart and agree that the record accurately reflects my personal performance of the history, physical exam, assessment and plan.

## 2020-03-12 NOTE — HISTORY OF PRESENT ILLNESS
[0] : no pain reported [Clean/Dry/Intact] : clean, dry and intact [Healed] : healed [Neuro Intact] : an unremarkable neurological exam [Vascular Intact] : ~T peripheral vascular exam normal [Doing Well] : is doing well [Excellent Pain Control] : has excellent pain control [No Sign of Infection] : is showing no signs of infection [Sutures Removed] : sutures were removed [Steri-Strips Removed & Replaced] : steri-strips removed and replaced [None] : None [Chills] : no chills [Fever] : no fever [Nausea] : no nausea [Vomiting] : no vomiting [Erythema] : not erythematous [Discharge] : absent of discharge [Swelling] : not swollen [Dehiscence] : not dehisced [de-identified] : 55 year old male s/p right shoulder RTCR, SAD 3/4/20. [de-identified] : 55 year old male s/p right shoulder RTCR, SAD 3/4/20. He is doing well. He presents in brace. Reports occasional pain at night. Denies post op complications.  [de-identified] : Right shoulder exam\par  \par ·Inspection: Min ecchymosis, no residual swelling\par ·Skin: Incisions C/D/I, no drainage, healed\par ·ROM: not tested\par ·Painful arc ROM: not tested\par ·Tenderness: Tenderness throughout the shoulder girdle\par ·Strength: Unable to test\par ·Vasc: 2+ radial pulse\par ·Neuro: AIN, PIN, Ulnar nerve intact to motor\par Sensation: Intact to light touch throughout  [de-identified] : 55 year old male s/p right shoulder RTCR, SAD 3/4/20.\par \par All intraoperative imaging was discussed in detail with the patient. All questions were answered regarding surgical procedure as well as immediate post-operative recovery period. We also discussed the post-operative rehabilitation program in great detail.\par \par Recommendations:\par \par 1. PT evaluation and treatment as per protocol provided (Rx given). HEP as instructed.\par 2. Brace: Discussed use of abduction brace, removal for hygiene and HEP.\par 3. Meds: Wean pain medication, may transition to Tylenol/NSAID's as tolerated.\par 4. Ice/cryocuff as needed\par 5. Restrictions: As discussed\par \par Followup 4 weeks for repeat clinical evaluation.

## 2020-04-06 ENCOUNTER — APPOINTMENT (OUTPATIENT)
Dept: ORTHOPEDIC SURGERY | Facility: CLINIC | Age: 55
End: 2020-04-06

## 2020-04-07 ENCOUNTER — APPOINTMENT (OUTPATIENT)
Dept: ORTHOPEDIC SURGERY | Facility: CLINIC | Age: 55
End: 2020-04-07

## 2020-04-08 NOTE — HISTORY OF PRESENT ILLNESS
[Home] : at home, [unfilled] , at the time of the visit. [Other Location: e.g. Home (Enter Location, City,State)___] : at [unfilled] [Patient] : the patient [Self] : self [FreeTextEntry4] : Yan Dinero [0] : no pain reported [Chills] : no chills [Fever] : no fever [Nausea] : no nausea [Vomiting] : no vomiting [Clean/Dry/Intact] : clean, dry and intact [Healed] : healed [Erythema] : not erythematous [Discharge] : absent of discharge [Swelling] : not swollen [Dehiscence] : not dehisced [Neuro Intact] : an unremarkable neurological exam [Vascular Intact] : ~T peripheral vascular exam normal [Doing Well] : is doing well [Excellent Pain Control] : has excellent pain control [No Sign of Infection] : is showing no signs of infection [None] : None [de-identified] : 55 year old male s/p right shoulder RTCR, SAD 3/4/20. [de-identified] : 55 year old male s/p right shoulder RTCR, SAD 3/4/20. He is doing well. Discontinued brace as him mother fell and broke shoulder, she is now using his brace. Reports occasional pain at night. Doing well with ROM/PT. Denies post op complications.  [de-identified] : Right shoulder exam\par  \par ·Inspection: No swelling\par ·Skin: Incisions C/D/I, no drainage, healed\par ·ROM: , ABD 90, ER 10, IR hip\par ·Painful arc ROM: Min with ROM\par ·Tenderness: Min\par ·Strength: Unable to test\par ·Vasc: Pink / perfused\par ·Neuro: AIN, PIN, Ulnar nerve intact to motor\par Sensation: Intact to light touch throughout  [de-identified] : 55 year old male s/p right shoulder RTCR, SAD 3/4/20.\par \par We discussed the post-operative rehabilitation program in great detail. Patient self-discontinued brace under my guidance at 4wks. Otherwise well without major issues. Reporting good ROM, min pain. Progressing with PT. Looking to return to work in 6wks. \par \par Recommendations:\par \par 1. PT/HEP as instructed.\par 2. Brace: Dicontinued\par 3. Meds: Tylenol/NSAID's as tolerated.\par 4. Ice/cryocuff as needed\par 5. Restrictions: As discussed\par \par Followup 6 weeks for repeat clinical evaluation.

## 2020-04-10 ENCOUNTER — APPOINTMENT (OUTPATIENT)
Dept: ORTHOPEDIC SURGERY | Facility: CLINIC | Age: 55
End: 2020-04-10

## 2020-05-11 ENCOUNTER — APPOINTMENT (OUTPATIENT)
Dept: ORTHOPEDIC SURGERY | Facility: CLINIC | Age: 55
End: 2020-05-11
Payer: COMMERCIAL

## 2020-05-11 VITALS — TEMPERATURE: 98.4 F

## 2020-05-11 PROCEDURE — 99024 POSTOP FOLLOW-UP VISIT: CPT

## 2020-05-12 NOTE — HISTORY OF PRESENT ILLNESS
[Clean/Dry/Intact] : clean, dry and intact [Healed] : healed [Neuro Intact] : an unremarkable neurological exam [Vascular Intact] : ~T peripheral vascular exam normal [Doing Well] : is doing well [Excellent Pain Control] : has excellent pain control [No Sign of Infection] : is showing no signs of infection [None] : None [3] : the patient reports pain that is 3/10 in severity [Chills] : no chills [Fever] : no fever [Nausea] : no nausea [Vomiting] : no vomiting [Erythema] : not erythematous [Discharge] : absent of discharge [Swelling] : not swollen [Dehiscence] : not dehisced [de-identified] : 55 year old male s/p right shoulder RTCR, SAD 3/4/20. [de-identified] : 55 year old male s/p right shoulder RTCR, SAD 3/4/20. He is doing well. Attending PT 2 x per week. Reports pain with activities and working.  Reports occasional night time tingling in both hands in past 10 days. Denies post op complications.  [de-identified] : Right shoulder exam\par  \par ·Inspection: No swelling\par ·Skin: Incisions C/D/I, no drainage, healed\par ·ROM: , ABD 90, ER 40, IR mid lumbar\par ·Painful arc ROM: Min with ROM\par ·Tenderness: Min\par ·Strength: 4/5 FF, 4+/5 ER/IR\par ·Vasc: 2+ radial pulse\par ·Neuro: AIN, PIN, Ulnar nerve intact to motor\par Sensation: Intact to light touch throughout  [de-identified] : 55 year old male s/p right shoulder RTCR, SAD 3/4/20.\par \par Significantly improved range of motion of function at this time.  Patient is doing well with rehabilitation.  Looking to return to work in early June without restrictions.\par \par Recommendations:\par \par 1. PT/HEP as instructed.\par 2. Brace: Dicontinued\par 3. Meds: Tylenol/NSAID's as tolerated.\par 4. Ice/cryocuff as needed\par 5. Restrictions: As discussed\par \par Followup 4 weeks for repeat clinical evaluation.

## 2020-06-08 ENCOUNTER — APPOINTMENT (OUTPATIENT)
Dept: ORTHOPEDIC SURGERY | Facility: CLINIC | Age: 55
End: 2020-06-08
Payer: COMMERCIAL

## 2020-06-08 VITALS — TEMPERATURE: 97.2 F | HEIGHT: 78 IN

## 2020-06-08 PROCEDURE — 99213 OFFICE O/P EST LOW 20 MIN: CPT

## 2020-06-08 NOTE — DISCUSSION/SUMMARY
[de-identified] : 55 year old male s/p right shoulder RTCR, SAD 3/4/20.\par \par Patient is working on strength and function to the right upper extremity.  Patient has good range of motion and improved strength in forward flexion at this time.  Patient is improved his internal rotation also.  Patient has decreased physical therapy to once a week. \par \par Recommendations:\par \par 1. PT/HEP as instructed.\par 2. Meds: Tylenol/NSAID's as tolerated.\par 3. RTW as able\par \par Followup 2mos\par

## 2020-06-08 NOTE — PHYSICAL EXAM
[de-identified] : ·	Oriented to time, place, person\par ·	Mood: Normal\par ·	Affect: Normal\par ·	Appearance: Healthy, well appearing, no acute distress.\par ·	Gait: Normal\par ·	Assistive Devices: None\par \par Right shoulder exam\par  \par ·Inspection: No swelling\par ·Skin: Incisions C/D/I, no drainage, healed\par ·ROM: , ABD 90, ER 40, IR mid lumbar\par ·Painful arc ROM: Min with ROM\par ·Tenderness: Min\par ·Strength: 4++/5 FF, 5/5 ER/IR\par ·Vasc: 2+ radial pulse\par ·Neuro: AIN, PIN, Ulnar nerve intact to motor\par Sensation: Intact to light touch throughout.

## 2020-06-08 NOTE — HISTORY OF PRESENT ILLNESS
[de-identified] : 55 year old male s/p right shoulder RTCR, SAD 3/4/20. He is doing well.  Attending PT 1 x per week. Reports mild pain at night and with activities. Reports significant improvement of night time tingling in both hands. Denies post op complications.  Looking to return to work in a full capacity at this time.

## 2020-07-23 ENCOUNTER — APPOINTMENT (OUTPATIENT)
Dept: ORTHOPEDIC SURGERY | Facility: CLINIC | Age: 55
End: 2020-07-23
Payer: COMMERCIAL

## 2020-07-23 VITALS — BODY MASS INDEX: 24.15 KG/M2 | WEIGHT: 123 LBS | HEIGHT: 60 IN

## 2020-07-23 VITALS — TEMPERATURE: 97.8 F

## 2020-07-23 PROCEDURE — 99213 OFFICE O/P EST LOW 20 MIN: CPT

## 2020-08-10 NOTE — DISCUSSION/SUMMARY
[de-identified] : 55 year old male s/p right shoulder RTCR, SAD\par \par Patient is 4 months status post rotator cuff repair and doing well.  Patient has some mild complaints of some endurance loss, loss of terminal strength.  But has improved range of motion and function at this time.  Denies any significant dysfunction.\par \par Recommendations:\par \par 1. PT/HEP as instructed.\par 2. Meds: Tylenol/NSAID's as tolerated.\par 3. RTW as able\par \par Followup 3mos\par

## 2020-08-10 NOTE — PHYSICAL EXAM
[de-identified] : ·	Oriented to time, place, person\par ·	Mood: Normal\par ·	Affect: Normal\par ·	Appearance: Healthy, well appearing, no acute distress.\par ·	Gait: Normal\par ·	Assistive Devices: None\par \par Right shoulder exam\par  \par ·Inspection: No swelling\par ·Skin: Incisions C/D/I, no drainage, healed\par ·ROM: , ABD 90, ER 40, IR mid lumbar\par ·Painful arc ROM: Min with ROM\par ·Tenderness: None\par ·Strength: 5/5 FF, 5/5 ER/IR\par ·Vasc: 2+ radial pulse\par ·Neuro: AIN, PIN, Ulnar nerve intact to motor\par Sensation: Intact to light touch throughout.

## 2020-08-10 NOTE — HISTORY OF PRESENT ILLNESS
[de-identified] : 55 year old male s/p right shoulder RTCR, SAD 3/4/20. He is doing well.  Attending PT 1 x per week and doing HEP. Reports improvement of pain since last visit. Reports resolution of night time tingling in both hands. Denies post op complications.  Patient has gone back to work at this time.  Denies any significant issues.

## 2020-09-24 ENCOUNTER — APPOINTMENT (OUTPATIENT)
Dept: ORTHOPEDIC SURGERY | Facility: CLINIC | Age: 55
End: 2020-09-24
Payer: COMMERCIAL

## 2020-09-24 VITALS — TEMPERATURE: 97.3 F

## 2020-09-24 DIAGNOSIS — M75.121 COMPLETE ROTATOR CUFF TEAR OR RUPTURE OF RIGHT SHOULDER, NOT SPECIFIED AS TRAUMATIC: ICD-10-CM

## 2020-09-24 PROCEDURE — 99213 OFFICE O/P EST LOW 20 MIN: CPT

## 2020-09-25 PROBLEM — M75.121 COMPLETE TEAR OF RIGHT ROTATOR CUFF, UNSPECIFIED WHETHER TRAUMATIC: Status: ACTIVE | Noted: 2020-01-28

## 2020-09-25 NOTE — HISTORY OF PRESENT ILLNESS
[de-identified] : 55 year old male s/p right shoulder RTCR, SAD 3/4/20. He is doing well.  He completed PT August 2020 . Reports mild main at night otherwise doing well. He has no compliants. He is happy with post surgical results.   Denies post op complications.  Patient has gone back to work at this time.  Denies any significant issues.

## 2020-09-25 NOTE — DISCUSSION/SUMMARY
[de-identified] : 55 year old male s/p right shoulder RTCR, SAD\par \par Patient is 6 months status post rotator cuff repair and doing well.  No complaints.   Denies any significant dysfunction.\par \par Recommendations:\par Follow up as needed.

## 2020-09-25 NOTE — PHYSICAL EXAM
[de-identified] : ·	Oriented to time, place, person\par ·	Mood: Normal\par ·	Affect: Normal\par ·	Appearance: Healthy, well appearing, no acute distress.\par ·	Gait: Normal\par ·	Assistive Devices: None\par \par Right shoulder exam\par  \par ·Inspection: No swelling\par ·Skin: Incisions C/D/I, no drainage, healed\par ·ROM: , ABD 90, ER 40, IR mid lumbar\par ·Painful arc ROM: None\par ·Tenderness: None\par ·Strength: 5/5 FF, 5/5 ER/IR\par ·Vasc: 2+ radial pulse\par ·Neuro: AIN, PIN, Ulnar nerve intact to motor\par Sensation: Intact to light touch throughout.

## 2021-04-20 ENCOUNTER — EMERGENCY (EMERGENCY)
Facility: HOSPITAL | Age: 56
LOS: 1 days | Discharge: HOME | End: 2021-04-20
Attending: EMERGENCY MEDICINE
Payer: COMMERCIAL

## 2021-04-20 VITALS
RESPIRATION RATE: 20 BRPM | TEMPERATURE: 98 F | OXYGEN SATURATION: 99 % | DIASTOLIC BLOOD PRESSURE: 78 MMHG | HEART RATE: 78 BPM | SYSTOLIC BLOOD PRESSURE: 121 MMHG

## 2021-04-20 VITALS
OXYGEN SATURATION: 100 % | WEIGHT: 138.89 LBS | DIASTOLIC BLOOD PRESSURE: 84 MMHG | RESPIRATION RATE: 16 BRPM | SYSTOLIC BLOOD PRESSURE: 139 MMHG | HEIGHT: 67 IN | TEMPERATURE: 98 F | HEART RATE: 61 BPM

## 2021-04-20 DIAGNOSIS — Z98.890 OTHER SPECIFIED POSTPROCEDURAL STATES: Chronic | ICD-10-CM

## 2021-04-20 LAB
ALBUMIN SERPL ELPH-MCNC: 4.4 G/DL — SIGNIFICANT CHANGE UP (ref 3.3–5)
ALP SERPL-CCNC: 67 U/L — SIGNIFICANT CHANGE UP (ref 40–120)
ALT FLD-CCNC: 11 U/L — SIGNIFICANT CHANGE UP (ref 10–45)
ANION GAP SERPL CALC-SCNC: 13 MMOL/L — SIGNIFICANT CHANGE UP (ref 5–17)
APPEARANCE UR: CLEAR — SIGNIFICANT CHANGE UP
AST SERPL-CCNC: 18 U/L — SIGNIFICANT CHANGE UP (ref 10–40)
BACTERIA # UR AUTO: NEGATIVE — SIGNIFICANT CHANGE UP
BASOPHILS # BLD AUTO: 0.04 K/UL — SIGNIFICANT CHANGE UP (ref 0–0.2)
BASOPHILS NFR BLD AUTO: 0.4 % — SIGNIFICANT CHANGE UP (ref 0–2)
BILIRUB SERPL-MCNC: 0.6 MG/DL — SIGNIFICANT CHANGE UP (ref 0.2–1.2)
BILIRUB UR-MCNC: NEGATIVE — SIGNIFICANT CHANGE UP
BUN SERPL-MCNC: 19 MG/DL — SIGNIFICANT CHANGE UP (ref 7–23)
CALCIUM SERPL-MCNC: 9.4 MG/DL — SIGNIFICANT CHANGE UP (ref 8.4–10.5)
CHLORIDE SERPL-SCNC: 101 MMOL/L — SIGNIFICANT CHANGE UP (ref 96–108)
CO2 SERPL-SCNC: 23 MMOL/L — SIGNIFICANT CHANGE UP (ref 22–31)
COLOR SPEC: YELLOW — SIGNIFICANT CHANGE UP
CREAT SERPL-MCNC: 1.27 MG/DL — SIGNIFICANT CHANGE UP (ref 0.5–1.3)
DIFF PNL FLD: ABNORMAL
EOSINOPHIL # BLD AUTO: 0.04 K/UL — SIGNIFICANT CHANGE UP (ref 0–0.5)
EOSINOPHIL NFR BLD AUTO: 0.4 % — SIGNIFICANT CHANGE UP (ref 0–6)
EPI CELLS # UR: 1 /HPF — SIGNIFICANT CHANGE UP
GLUCOSE SERPL-MCNC: 188 MG/DL — HIGH (ref 70–99)
GLUCOSE UR QL: NEGATIVE — SIGNIFICANT CHANGE UP
HCT VFR BLD CALC: 46.2 % — SIGNIFICANT CHANGE UP (ref 39–50)
HGB BLD-MCNC: 15.4 G/DL — SIGNIFICANT CHANGE UP (ref 13–17)
HYALINE CASTS # UR AUTO: 1 /LPF — SIGNIFICANT CHANGE UP (ref 0–2)
IMM GRANULOCYTES NFR BLD AUTO: 0.3 % — SIGNIFICANT CHANGE UP (ref 0–1.5)
KETONES UR-MCNC: ABNORMAL
LEUKOCYTE ESTERASE UR-ACNC: NEGATIVE — SIGNIFICANT CHANGE UP
LYMPHOCYTES # BLD AUTO: 0.78 K/UL — LOW (ref 1–3.3)
LYMPHOCYTES # BLD AUTO: 7.6 % — LOW (ref 13–44)
MCHC RBC-ENTMCNC: 30 PG — SIGNIFICANT CHANGE UP (ref 27–34)
MCHC RBC-ENTMCNC: 33.3 GM/DL — SIGNIFICANT CHANGE UP (ref 32–36)
MCV RBC AUTO: 89.9 FL — SIGNIFICANT CHANGE UP (ref 80–100)
MONOCYTES # BLD AUTO: 0.36 K/UL — SIGNIFICANT CHANGE UP (ref 0–0.9)
MONOCYTES NFR BLD AUTO: 3.5 % — SIGNIFICANT CHANGE UP (ref 2–14)
NEUTROPHILS # BLD AUTO: 8.97 K/UL — HIGH (ref 1.8–7.4)
NEUTROPHILS NFR BLD AUTO: 87.8 % — HIGH (ref 43–77)
NITRITE UR-MCNC: NEGATIVE — SIGNIFICANT CHANGE UP
NRBC # BLD: 0 /100 WBCS — SIGNIFICANT CHANGE UP (ref 0–0)
PH UR: 6 — SIGNIFICANT CHANGE UP (ref 5–8)
PLATELET # BLD AUTO: 158 K/UL — SIGNIFICANT CHANGE UP (ref 150–400)
POTASSIUM SERPL-MCNC: 4.4 MMOL/L — SIGNIFICANT CHANGE UP (ref 3.5–5.3)
POTASSIUM SERPL-SCNC: 4.4 MMOL/L — SIGNIFICANT CHANGE UP (ref 3.5–5.3)
PROT SERPL-MCNC: 7.1 G/DL — SIGNIFICANT CHANGE UP (ref 6–8.3)
PROT UR-MCNC: ABNORMAL
RBC # BLD: 5.14 M/UL — SIGNIFICANT CHANGE UP (ref 4.2–5.8)
RBC # FLD: 11.7 % — SIGNIFICANT CHANGE UP (ref 10.3–14.5)
RBC CASTS # UR COMP ASSIST: 4 /HPF — SIGNIFICANT CHANGE UP (ref 0–4)
SODIUM SERPL-SCNC: 137 MMOL/L — SIGNIFICANT CHANGE UP (ref 135–145)
SP GR SPEC: 1.03 — HIGH (ref 1.01–1.02)
UROBILINOGEN FLD QL: NEGATIVE — SIGNIFICANT CHANGE UP
WBC # BLD: 10.22 K/UL — SIGNIFICANT CHANGE UP (ref 3.8–10.5)
WBC # FLD AUTO: 10.22 K/UL — SIGNIFICANT CHANGE UP (ref 3.8–10.5)
WBC UR QL: 0 /HPF — SIGNIFICANT CHANGE UP (ref 0–5)

## 2021-04-20 PROCEDURE — 81001 URINALYSIS AUTO W/SCOPE: CPT

## 2021-04-20 PROCEDURE — 96375 TX/PRO/DX INJ NEW DRUG ADDON: CPT

## 2021-04-20 PROCEDURE — 85025 COMPLETE CBC W/AUTO DIFF WBC: CPT

## 2021-04-20 PROCEDURE — 80053 COMPREHEN METABOLIC PANEL: CPT

## 2021-04-20 PROCEDURE — 74176 CT ABD & PELVIS W/O CONTRAST: CPT

## 2021-04-20 PROCEDURE — 99284 EMERGENCY DEPT VISIT MOD MDM: CPT | Mod: 25

## 2021-04-20 PROCEDURE — G1004: CPT

## 2021-04-20 PROCEDURE — 74176 CT ABD & PELVIS W/O CONTRAST: CPT | Mod: 26,MG

## 2021-04-20 PROCEDURE — 87086 URINE CULTURE/COLONY COUNT: CPT

## 2021-04-20 PROCEDURE — 99285 EMERGENCY DEPT VISIT HI MDM: CPT

## 2021-04-20 PROCEDURE — 96374 THER/PROPH/DIAG INJ IV PUSH: CPT

## 2021-04-20 RX ORDER — OXYCODONE HYDROCHLORIDE 5 MG/1
5 TABLET ORAL ONCE
Refills: 0 | Status: DISCONTINUED | OUTPATIENT
Start: 2021-04-20 | End: 2021-04-20

## 2021-04-20 RX ORDER — ONDANSETRON 8 MG/1
4 TABLET, FILM COATED ORAL ONCE
Refills: 0 | Status: COMPLETED | OUTPATIENT
Start: 2021-04-20 | End: 2021-04-20

## 2021-04-20 RX ORDER — OXYCODONE HYDROCHLORIDE 5 MG/1
1 TABLET ORAL
Qty: 12 | Refills: 0
Start: 2021-04-20 | End: 2021-04-22

## 2021-04-20 RX ORDER — SODIUM CHLORIDE 9 MG/ML
1000 INJECTION, SOLUTION INTRAVENOUS ONCE
Refills: 0 | Status: COMPLETED | OUTPATIENT
Start: 2021-04-20 | End: 2021-04-20

## 2021-04-20 RX ORDER — KETOROLAC TROMETHAMINE 30 MG/ML
15 SYRINGE (ML) INJECTION ONCE
Refills: 0 | Status: DISCONTINUED | OUTPATIENT
Start: 2021-04-20 | End: 2021-04-20

## 2021-04-20 RX ORDER — MORPHINE SULFATE 50 MG/1
2 CAPSULE, EXTENDED RELEASE ORAL ONCE
Refills: 0 | Status: DISCONTINUED | OUTPATIENT
Start: 2021-04-20 | End: 2021-04-20

## 2021-04-20 RX ORDER — SODIUM CHLORIDE 9 MG/ML
1000 INJECTION INTRAMUSCULAR; INTRAVENOUS; SUBCUTANEOUS ONCE
Refills: 0 | Status: COMPLETED | OUTPATIENT
Start: 2021-04-20 | End: 2021-04-20

## 2021-04-20 RX ADMIN — OXYCODONE HYDROCHLORIDE 5 MILLIGRAM(S): 5 TABLET ORAL at 13:35

## 2021-04-20 RX ADMIN — ONDANSETRON 4 MILLIGRAM(S): 8 TABLET, FILM COATED ORAL at 12:17

## 2021-04-20 RX ADMIN — SODIUM CHLORIDE 1000 MILLILITER(S): 9 INJECTION, SOLUTION INTRAVENOUS at 13:35

## 2021-04-20 RX ADMIN — Medication 15 MILLIGRAM(S): at 12:17

## 2021-04-20 RX ADMIN — MORPHINE SULFATE 2 MILLIGRAM(S): 50 CAPSULE, EXTENDED RELEASE ORAL at 12:17

## 2021-04-20 NOTE — ED PROVIDER NOTE - PHYSICAL EXAMINATION
Gen - In moderate distress, actively vomiting but responds appropriately; A+Ox3   HEENT - NCAT, EOMI  Neck - supple  Resp - CTAB, symmetric breath sounds  CV -  RRR  Abd - soft, NT, ND; no guarding or rebound  Back -L CVA tenderness  MSK - 5/5 strength and FROM b/l UE and LE  Extrem - 3+ distal pulses b/L UE and LE  Skin - no rash or bruising, warm and well perfused  Neuro - no focal motor or sensation deficits

## 2021-04-20 NOTE — ED PROVIDER NOTE - PROGRESS NOTE DETAILS
Donal PGY-1: Patient reassessed following zofran/toradol/morphine, pain and nausea significantly improved, declines further pain control at this time. Pending CT read and UA.

## 2021-04-20 NOTE — ED ADULT NURSE NOTE - CAS ELECT INFOMATION PROVIDED
Discharge instructions provided to patient, patient verbalized understanding of instructions, ambulatory out of er with steady gait. Christi, RN/DC instructions

## 2021-04-20 NOTE — ED PROVIDER NOTE - NSFOLLOWUPINSTRUCTIONS_ED_ALL_ED_FT
You were seen in the Emergency Department for: Kidney stone    For pain/fever, you may take Tylenol (acetaminophen) 650 mg every 6 hours, OR Advil (ibuprofen) 400 mg every 8 hours.    Please follow up with your urologist in the next 3-7 days. If you do not have a primary physician, please call [phone] to find one convenient for you. At this number you will be able to locate a provider who accepts your insurance, as well as locate the right specialist for your needs.    You should return to the Emergency Department if you feel any new/worsening/persistent symptoms including but not limited to: chest pain, difficulty breathing, loss of consciousness, bleeding, uncontrolled pain, numbness/weakness of a body part

## 2021-04-20 NOTE — ED ADULT NURSE REASSESSMENT NOTE - NS ED NURSE REASSESS COMMENT FT1
PAtient resting in bed comfortably in no acute distress, +flank pain, pending further orders/results, patient updated on plan of care. Will continue to monitor. MAGGIE Yang

## 2021-04-20 NOTE — ED PROVIDER NOTE - ATTENDING CONTRIBUTION TO CARE
Attending Statement (MARCUS Munoz MD):    HPI: 57y/o M with h/o asthma, BPH and nephrolihtiasis, presenting with left flank pain, nausea and vomiting since 7:30 AM this morning; sudden onset, no genital/ testicular pain; no fever, no abd pain. No urinary symptoms    Review of Systems:  -General: no fever or chills  -ENT: no congestion, no difficulty swallowing  -Pulmonary: no cough, no shortness of breath  -Cardiac: no chest pain, no palpitations  -Gastrointestinal: no abdominal pain, no nausea, no vomiting, and no diarrhea.  -Genitourinary: no blood or pain with urination  -Musculoskeletal: no back or neck pain  -Skin: no rashes  -Endocrine: No h/o diabetes or thyroid disease  -Neurologic: No focal weakness or numbness    All else negative unless otherwise specified elsewhere in this note.    PSH/PMH as noted above    On Physical Exam:  General: well appearing, in NAD, speaking clearly in full sentences and without difficulty; cooperative with exam  HEENT: PERRL, MMM  Neck: no neck tenderness, no nuchal rigidity  Cardiac: normal s1, s2; RRR; no MGR  Lungs: CTABL  Abdomen: soft nontender/nondistended  : no bladder tenderness or distension  Skin: intact, no rash  Extremities: no peripheral edema, no gross deformities  Neuro: no gross neurologic deficits    MDM: Attending Statement (MARCUS Munoz MD):    HPI: 55y/o M with h/o asthma, BPH and nephrolihtiasis, presenting with left flank pain, nausea and vomiting since 7:30 AM this morning; sudden onset, no genital/ testicular pain; no fever, no abd pain. No urinary symptoms    Review of Systems:  -General: no fever or chills  -ENT: no congestion, no difficulty swallowing  -Pulmonary: no cough, no shortness of breath  -Cardiac: no chest pain, no palpitations  -Gastrointestinal: no abdominal pain, no nausea, no vomiting, and no diarrhea.  -Genitourinary: no blood or pain with urination  -Musculoskeletal: no back or neck pain  -Skin: no rashes  -Endocrine: No h/o diabetes or thyroid disease  -Neurologic: No focal weakness or numbness    All else negative unless otherwise specified elsewhere in this note.    PSH/PMH as noted above    On Physical Exam:  General: well appearing, in NAD, speaking clearly in full sentences and without difficulty; cooperative with exam  HEENT: PERRL, MMM  Neck: no neck tenderness, no nuchal rigidity  Cardiac: normal s1, s2; RRR; no MGR  Lungs: CTABL  Abdomen: soft nontender/nondistended  Back: + L CVA tenderness  : no bladder tenderness or distension  Skin: intact, no rash  Extremities: no peripheral edema, no gross deformities  Neuro: no gross neurologic deficits    MDM: Left flank pain with h/o nephrolithiasis, strongly suspicious for renal colic / ureteral stone. No fever, no dysuria; unlikely pyelonephritis.  No hypotension to suggest rupture AAA.  No abd tenderness to suggest acute intraperitoneal infection/surgical pathology.  Will obtain screening labs: cbc (to evaluate for leukocytosis or anemia), CMP (to evaluate for electrolyte abnormalities or renal/liver dysfunction) and UA (eval for UTI); and obtain CT AP for further evaluation (d/w patient possible US for eval for hydro but patient requesting ct to exlude need for urooogic intervention/procedure).  Pain control and iv fluids; reassess after initial labs/imaging.    ED Course: patient improved after medications, transitioned to oral pain medication.  Labs reviewed, no acute actionable findings; no significant leukocytosis, no SANDY, and ua not c/w uti. CT shows 3mm L UVJ stone.  Will dc with instructions to f/u with his urologist.  D/w patient use of flomax but patient elected to call and discuss with his urologist.  Results of ED evaluation including labs and CT d/w patient, who verbalizes understanding of ED evaluation and discharge plan including medications, follow-up instructions and return precautions.

## 2021-04-20 NOTE — ED PROVIDER NOTE - PATIENT PORTAL LINK FT
You can access the FollowMyHealth Patient Portal offered by [facility] by registering at the following website: [email or web address]. By joining [employer]’s FollowMyHealth portal, you will also be able to view your health information using other applications (apps) compatible with our system.

## 2021-04-20 NOTE — ED PROVIDER NOTE - PMH
Asthma    BPH (benign prostatic hyperplasia)    Hyperlipidemia  diet controlled  Nephrolithiasis

## 2021-04-20 NOTE — ED PROVIDER NOTE - CLINICAL SUMMARY MEDICAL DECISION MAKING FREE TEXT BOX
56 year old male with history of kidney stones presenting with acute L flank pain and N/V since 7:30 AM. Patient is in distress here, complains of significant pain and vomiting, L CVA tenderness 56 year old male with history of kidney stones presenting with acute L flank pain and N/V since 7:30 AM. Patient is in distress here, complains of significant pain and vomiting, L CVA tenderness on exam. Will symptom control, obtain CT non con stone hunt, assess UA for infection, labs to assess kidney function/infection, reassess

## 2021-04-20 NOTE — ED PROVIDER NOTE - OBJECTIVE STATEMENT
56 year old male with history of kidney stones presenting with acute L flank pain and N/V since 7:30 AM.     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 56 year old male with history of kidney stones presenting with acute L flank pain and N/V since 7:30 AM. States that he has long standing history of kidney stones, feels somewhat different this time, usually passes within 3-4 hours. Has been constantly vomiting. Denies fevers/chills, dysuria, frequency. Patient is an OBGYN.

## 2021-04-20 NOTE — ED PROVIDER NOTE - NS ED ROS FT
Gen: No fever, no weight loss, no fatigue  CV: No chest pain, no palpitations  HEENT: No sore throat, no hoarseness  Skin: No rash, no color changes  Resp: No SOB, no cough  Endo: No sensitivity to heat or cold, no appetite changes  GI: No constipation, no diarrhea, no nausea, no vomiting  Msk: No back pain, no LE swelling, no extremity pain  : No dysuria, no increased frequency  Neuro: No LOC, no weakness, no numbness

## 2021-04-20 NOTE — ED ADULT NURSE NOTE - OBJECTIVE STATEMENT
Male 56 years old with past medical history of Asthma and Kidney stone came in for sudden onset of left flank pain this morning associated with nausea, vomiting and burning on urination. Pain is sharp 8/10 radiating to his left lower quadrant. Denies fever, chills. chest pain, or blood in urine. Evaluated by MD. Will continue to reassess. Male 56 years old with past medical history of Asthma and Kidney stone came in for sudden onset of left flank pain this morning associated with nausea, vomited  5 times and burning on urination. Pain is sharp 8/10 radiating to his left lower quadrant. Denies fever, chills. chest pain, or blood in urine. Evaluated by MD. Will continue to reassess.

## 2021-04-21 LAB
CULTURE RESULTS: SIGNIFICANT CHANGE UP
SPECIMEN SOURCE: SIGNIFICANT CHANGE UP

## 2021-07-09 NOTE — ED ADULT NURSE NOTE - NSFALLRSKOUTCOME_ED_ALL_ED
Faxed back to pharmacy with current PCP name and fax number. No longer a patient of Bertha Valenzuela    Refill      Fluticasone 50mcg spr    2 sprays in each nostril daily   Routine Reassessment Universal Safety Interventions

## 2021-07-13 ENCOUNTER — APPOINTMENT (OUTPATIENT)
Dept: PLASTIC SURGERY | Facility: CLINIC | Age: 56
End: 2021-07-13
Payer: COMMERCIAL

## 2021-07-13 DIAGNOSIS — D23.9 OTHER BENIGN NEOPLASM OF SKIN, UNSPECIFIED: ICD-10-CM

## 2021-07-13 PROCEDURE — 99203 OFFICE O/P NEW LOW 30 MIN: CPT

## 2021-07-13 NOTE — HISTORY OF PRESENT ILLNESS
[FreeTextEntry1] : 56 years old patient presents in the office for;\par Problem: mass\par Location:  left check\par Duration:  around a year\par Seen by Dermatology:  no\par Previous treatments: no\par Any itching:  no        bleeding:  no                Drainage: no\par Imaging/Biopsy: no\par Changes in size or color:  no\par Infection or inflammation: no\par Pain or discomfort: no\par Personal history of skin cancer, masses:  no\par Family history of cancer: no\par

## 2021-08-05 ENCOUNTER — LABORATORY RESULT (OUTPATIENT)
Age: 56
End: 2021-08-05

## 2021-08-05 ENCOUNTER — APPOINTMENT (OUTPATIENT)
Dept: PLASTIC SURGERY | Facility: CLINIC | Age: 56
End: 2021-08-05
Payer: COMMERCIAL

## 2021-08-05 PROCEDURE — 21011 EXC FACE LES SC <2 CM: CPT

## 2021-08-05 PROCEDURE — 13131 CMPLX RPR F/C/C/M/N/AX/G/H/F: CPT | Mod: 58

## 2021-08-05 NOTE — PROCEDURE
[FreeTextEntry6] : Preopdx: left cheek  cyst \par Procedure: excisional biopsy  1.1 cm, and complex closure 1.1 cm cheek\par Anesthesia: local 1% w/epi\par Specimens: to path on formalin\par No complications\par \par Summary:\par IC obtained.  Lesion demarcated with marking pen.  1%lido with epinephrine injected.  15 blade used to incise full thickness.    1.1Cm  lesion excised in subcutaneous plane.   Hemostasis obtained with cautery.  Skin edges widely undermined and closed for a complex closure of  1.1 cm.  bacitracin and steristrips placed.  \par \par

## 2021-08-05 NOTE — REASON FOR VISIT
[Procedure: _________] : a [unfilled] procedure visit [FreeTextEntry1] : Excisional biopsy mass of left cheek

## 2021-08-12 ENCOUNTER — APPOINTMENT (OUTPATIENT)
Dept: PLASTIC SURGERY | Facility: CLINIC | Age: 56
End: 2021-08-12
Payer: COMMERCIAL

## 2021-08-12 DIAGNOSIS — L72.0 EPIDERMAL CYST: ICD-10-CM

## 2021-08-12 PROCEDURE — 99024 POSTOP FOLLOW-UP VISIT: CPT

## 2021-08-12 NOTE — HISTORY OF PRESENT ILLNESS
[FreeTextEntry1] : DOP - 8/5/21\par S/P - Excision of left cheek mass.\par Path - Epidermoid Cyst \par No excessive bleeding, no fevers, no orders, No purulent discharge, no excessive pain.

## 2021-08-19 ENCOUNTER — APPOINTMENT (OUTPATIENT)
Dept: PRIMARY CARE | Facility: HOSPITAL | Age: 56
End: 2021-08-19

## 2021-08-19 ENCOUNTER — RESULT CHARGE (OUTPATIENT)
Age: 56
End: 2021-08-19

## 2021-08-19 ENCOUNTER — OUTPATIENT (OUTPATIENT)
Dept: OUTPATIENT SERVICES | Facility: HOSPITAL | Age: 56
LOS: 1 days | End: 2021-08-19

## 2021-08-19 VITALS
BODY MASS INDEX: 20.92 KG/M2 | OXYGEN SATURATION: 100 % | HEART RATE: 100 BPM | TEMPERATURE: 98 F | RESPIRATION RATE: 16 BRPM | HEIGHT: 68 IN | WEIGHT: 138 LBS | DIASTOLIC BLOOD PRESSURE: 69 MMHG | SYSTOLIC BLOOD PRESSURE: 126 MMHG

## 2021-08-19 DIAGNOSIS — J02.9 ACUTE PHARYNGITIS, UNSPECIFIED: ICD-10-CM

## 2021-08-19 DIAGNOSIS — Z98.890 OTHER SPECIFIED POSTPROCEDURAL STATES: Chronic | ICD-10-CM

## 2021-08-19 DIAGNOSIS — R09.81 NASAL CONGESTION: ICD-10-CM

## 2021-08-19 LAB
B PERT DNA SPEC QL NAA+PROBE: NOT DETECTED
BORDETELLA PARAPERTUSSIS: NOT DETECTED
C PNEUM DNA SPEC QL NAA+PROBE: NOT DETECTED
FLUAV SUBTYP SPEC NAA+PROBE: NOT DETECTED
FLUBV RNA SPEC QL NAA+PROBE: NOT DETECTED
HADV DNA SPEC QL NAA+PROBE: NOT DETECTED
HCOV 229E RNA SPEC QL NAA+PROBE: NOT DETECTED
HCOV HKU1 RNA SPEC QL NAA+PROBE: NOT DETECTED
HCOV NL63 RNA SPEC QL NAA+PROBE: NOT DETECTED
HCOV OC43 RNA SPEC QL NAA+PROBE: NOT DETECTED
HMPV RNA SPEC QL NAA+PROBE: NOT DETECTED
HPIV1 RNA SPEC QL NAA+PROBE: NOT DETECTED
HPIV2 RNA SPEC QL NAA+PROBE: NOT DETECTED
HPIV3 RNA SPEC QL NAA+PROBE: NOT DETECTED
HPIV4 RNA SPEC QL NAA+PROBE: NOT DETECTED
M PNEUMO DNA SPEC QL NAA+PROBE: NOT DETECTED
RAPID RVP RESULT: DETECTED
RSV RNA SPEC QL NAA+PROBE: NOT DETECTED
RV+EV RNA SPEC QL NAA+PROBE: DETECTED
S PYO AG SPEC QL IA: NEGATIVE
SARS-COV-2 RNA PNL RESP NAA+PROBE: NOT DETECTED

## 2021-08-19 NOTE — HISTORY OF PRESENT ILLNESS
[FreeTextEntry8] : 56M NKDA with PMH of HTN, BPH and PSH of shoulder surgery who [facility] due to flu like symptoms \par \par States that he had some cough, congestion. body ache and running nose for the past 3 days. He had  same symptoms and was tested negative for COVID-19 but  he still has the symptoms. He is fully vaccinated with COVID vaccine. Denies any fever, cough, sore throat or SOB. No loss of smell or taste, no chest tightness, or any GI related symptoms of nausea, vomiting or diarrhea. 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 He takes Allfuzosin as regular maintenance medications. Denies any chest pain, no chest palpitations or any respiratory distress\par \par \par

## 2021-08-19 NOTE — PHYSICAL EXAM
[No Acute Distress] : no acute distress [Normal Sclera/Conjunctiva] : normal sclera/conjunctiva [Normal Outer Ear/Nose] : the outer ears and nose were normal in appearance [No Respiratory Distress] : no respiratory distress  [No Accessory Muscle Use] : no accessory muscle use [Clear to Auscultation] : lungs were clear to auscultation bilaterally [Normal Rate] : normal rate  [Regular Rhythm] : with a regular rhythm [No Rash] : no rash [No Focal Deficits] : no focal deficits [Normal Gait] : normal gait [Normal Affect] : the affect was normal [Normal Insight/Judgement] : insight and judgment were intact [de-identified] : + nasal congestion, no maxillary tenderness, minimal tonsular erythema and no tonsular exudates [de-identified] : no tonsular  [de-identified] : no wheezing, no rhonchi and no crackles

## 2021-08-19 NOTE — REVIEW OF SYSTEMS
[Negative] : Heme/Lymph [FreeTextEntry6] : Positive for cough  [FreeTextEntry4] : Positive for nasal congestion [FreeTextEntry9] : Positive for body aches

## 2021-08-23 LAB — BACTERIA THROAT CULT: NORMAL

## 2021-08-24 DIAGNOSIS — J02.9 ACUTE PHARYNGITIS, UNSPECIFIED: ICD-10-CM

## 2021-08-24 DIAGNOSIS — R09.81 NASAL CONGESTION: ICD-10-CM

## 2021-09-30 ENCOUNTER — EMERGENCY (EMERGENCY)
Facility: HOSPITAL | Age: 56
LOS: 1 days | Discharge: HOME | End: 2021-09-30
Attending: STUDENT IN AN ORGANIZED HEALTH CARE EDUCATION/TRAINING PROGRAM
Payer: COMMERCIAL

## 2021-09-30 VITALS
TEMPERATURE: 98 F | RESPIRATION RATE: 16 BRPM | HEART RATE: 66 BPM | SYSTOLIC BLOOD PRESSURE: 131 MMHG | OXYGEN SATURATION: 100 % | DIASTOLIC BLOOD PRESSURE: 74 MMHG

## 2021-09-30 VITALS
DIASTOLIC BLOOD PRESSURE: 80 MMHG | HEIGHT: 67 IN | RESPIRATION RATE: 18 BRPM | HEART RATE: 81 BPM | OXYGEN SATURATION: 99 % | SYSTOLIC BLOOD PRESSURE: 159 MMHG | TEMPERATURE: 98 F

## 2021-09-30 DIAGNOSIS — Z98.890 OTHER SPECIFIED POSTPROCEDURAL STATES: Chronic | ICD-10-CM

## 2021-09-30 LAB
ALBUMIN SERPL ELPH-MCNC: 4.5 G/DL — SIGNIFICANT CHANGE UP (ref 3.3–5)
ALP SERPL-CCNC: 76 U/L — SIGNIFICANT CHANGE UP (ref 40–120)
ALT FLD-CCNC: 16 U/L — SIGNIFICANT CHANGE UP (ref 10–45)
ANION GAP SERPL CALC-SCNC: 12 MMOL/L — SIGNIFICANT CHANGE UP (ref 5–17)
APTT BLD: 31 SEC — SIGNIFICANT CHANGE UP (ref 27.5–35.5)
AST SERPL-CCNC: 18 U/L — SIGNIFICANT CHANGE UP (ref 10–40)
BASOPHILS # BLD AUTO: 0.05 K/UL — SIGNIFICANT CHANGE UP (ref 0–0.2)
BASOPHILS NFR BLD AUTO: 0.8 % — SIGNIFICANT CHANGE UP (ref 0–2)
BILIRUB SERPL-MCNC: 0.6 MG/DL — SIGNIFICANT CHANGE UP (ref 0.2–1.2)
BLD GP AB SCN SERPL QL: NEGATIVE — SIGNIFICANT CHANGE UP
BUN SERPL-MCNC: 16 MG/DL — SIGNIFICANT CHANGE UP (ref 7–23)
CALCIUM SERPL-MCNC: 9.3 MG/DL — SIGNIFICANT CHANGE UP (ref 8.4–10.5)
CHLORIDE SERPL-SCNC: 103 MMOL/L — SIGNIFICANT CHANGE UP (ref 96–108)
CK MB BLD-MCNC: 1.5 % — SIGNIFICANT CHANGE UP (ref 0–3.5)
CK MB CFR SERPL CALC: 1.9 NG/ML — SIGNIFICANT CHANGE UP (ref 0–6.7)
CK SERPL-CCNC: 123 U/L — SIGNIFICANT CHANGE UP (ref 30–200)
CO2 SERPL-SCNC: 24 MMOL/L — SIGNIFICANT CHANGE UP (ref 22–31)
CREAT SERPL-MCNC: 0.94 MG/DL — SIGNIFICANT CHANGE UP (ref 0.5–1.3)
EOSINOPHIL # BLD AUTO: 0.26 K/UL — SIGNIFICANT CHANGE UP (ref 0–0.5)
EOSINOPHIL NFR BLD AUTO: 4.2 % — SIGNIFICANT CHANGE UP (ref 0–6)
GLUCOSE SERPL-MCNC: 138 MG/DL — HIGH (ref 70–99)
HCT VFR BLD CALC: 44.9 % — SIGNIFICANT CHANGE UP (ref 39–50)
HGB BLD-MCNC: 14.6 G/DL — SIGNIFICANT CHANGE UP (ref 13–17)
IMM GRANULOCYTES NFR BLD AUTO: 0.2 % — SIGNIFICANT CHANGE UP (ref 0–1.5)
INR BLD: 1.07 RATIO — SIGNIFICANT CHANGE UP (ref 0.88–1.16)
LYMPHOCYTES # BLD AUTO: 2.55 K/UL — SIGNIFICANT CHANGE UP (ref 1–3.3)
LYMPHOCYTES # BLD AUTO: 41.3 % — SIGNIFICANT CHANGE UP (ref 13–44)
MCHC RBC-ENTMCNC: 28.9 PG — SIGNIFICANT CHANGE UP (ref 27–34)
MCHC RBC-ENTMCNC: 32.5 GM/DL — SIGNIFICANT CHANGE UP (ref 32–36)
MCV RBC AUTO: 88.9 FL — SIGNIFICANT CHANGE UP (ref 80–100)
MONOCYTES # BLD AUTO: 0.43 K/UL — SIGNIFICANT CHANGE UP (ref 0–0.9)
MONOCYTES NFR BLD AUTO: 7 % — SIGNIFICANT CHANGE UP (ref 2–14)
NEUTROPHILS # BLD AUTO: 2.88 K/UL — SIGNIFICANT CHANGE UP (ref 1.8–7.4)
NEUTROPHILS NFR BLD AUTO: 46.5 % — SIGNIFICANT CHANGE UP (ref 43–77)
NRBC # BLD: 0 /100 WBCS — SIGNIFICANT CHANGE UP (ref 0–0)
PLATELET # BLD AUTO: 173 K/UL — SIGNIFICANT CHANGE UP (ref 150–400)
POTASSIUM SERPL-MCNC: 4 MMOL/L — SIGNIFICANT CHANGE UP (ref 3.5–5.3)
POTASSIUM SERPL-SCNC: 4 MMOL/L — SIGNIFICANT CHANGE UP (ref 3.5–5.3)
PROT SERPL-MCNC: 7.1 G/DL — SIGNIFICANT CHANGE UP (ref 6–8.3)
PROTHROM AB SERPL-ACNC: 12.8 SEC — SIGNIFICANT CHANGE UP (ref 10.6–13.6)
RBC # BLD: 5.05 M/UL — SIGNIFICANT CHANGE UP (ref 4.2–5.8)
RBC # FLD: 12.1 % — SIGNIFICANT CHANGE UP (ref 10.3–14.5)
RH IG SCN BLD-IMP: POSITIVE — SIGNIFICANT CHANGE UP
SARS-COV-2 RNA SPEC QL NAA+PROBE: SIGNIFICANT CHANGE UP
SODIUM SERPL-SCNC: 139 MMOL/L — SIGNIFICANT CHANGE UP (ref 135–145)
TROPONIN T, HIGH SENSITIVITY RESULT: <6 NG/L — SIGNIFICANT CHANGE UP (ref 0–51)
WBC # BLD: 6.18 K/UL — SIGNIFICANT CHANGE UP (ref 3.8–10.5)
WBC # FLD AUTO: 6.18 K/UL — SIGNIFICANT CHANGE UP (ref 3.8–10.5)

## 2021-09-30 PROCEDURE — 82330 ASSAY OF CALCIUM: CPT

## 2021-09-30 PROCEDURE — 82803 BLOOD GASES ANY COMBINATION: CPT

## 2021-09-30 PROCEDURE — 86901 BLOOD TYPING SEROLOGIC RH(D): CPT

## 2021-09-30 PROCEDURE — 82947 ASSAY GLUCOSE BLOOD QUANT: CPT

## 2021-09-30 PROCEDURE — 70551 MRI BRAIN STEM W/O DYE: CPT | Mod: MA

## 2021-09-30 PROCEDURE — 80053 COMPREHEN METABOLIC PANEL: CPT

## 2021-09-30 PROCEDURE — 70498 CT ANGIOGRAPHY NECK: CPT | Mod: MA

## 2021-09-30 PROCEDURE — 82962 GLUCOSE BLOOD TEST: CPT

## 2021-09-30 PROCEDURE — 99291 CRITICAL CARE FIRST HOUR: CPT | Mod: 25

## 2021-09-30 PROCEDURE — 99291 CRITICAL CARE FIRST HOUR: CPT

## 2021-09-30 PROCEDURE — 84132 ASSAY OF SERUM POTASSIUM: CPT

## 2021-09-30 PROCEDURE — 85730 THROMBOPLASTIN TIME PARTIAL: CPT

## 2021-09-30 PROCEDURE — 84295 ASSAY OF SERUM SODIUM: CPT

## 2021-09-30 PROCEDURE — 0042T: CPT

## 2021-09-30 PROCEDURE — 70496 CT ANGIOGRAPHY HEAD: CPT | Mod: 26,MA

## 2021-09-30 PROCEDURE — 82435 ASSAY OF BLOOD CHLORIDE: CPT

## 2021-09-30 PROCEDURE — 93005 ELECTROCARDIOGRAM TRACING: CPT

## 2021-09-30 PROCEDURE — 71045 X-RAY EXAM CHEST 1 VIEW: CPT | Mod: 26

## 2021-09-30 PROCEDURE — 71045 X-RAY EXAM CHEST 1 VIEW: CPT

## 2021-09-30 PROCEDURE — 70498 CT ANGIOGRAPHY NECK: CPT | Mod: 26,MA

## 2021-09-30 PROCEDURE — 70450 CT HEAD/BRAIN W/O DYE: CPT | Mod: MA

## 2021-09-30 PROCEDURE — 85014 HEMATOCRIT: CPT

## 2021-09-30 PROCEDURE — 82550 ASSAY OF CK (CPK): CPT

## 2021-09-30 PROCEDURE — 85025 COMPLETE CBC W/AUTO DIFF WBC: CPT

## 2021-09-30 PROCEDURE — 83605 ASSAY OF LACTIC ACID: CPT

## 2021-09-30 PROCEDURE — U0003: CPT

## 2021-09-30 PROCEDURE — 86850 RBC ANTIBODY SCREEN: CPT

## 2021-09-30 PROCEDURE — 36415 COLL VENOUS BLD VENIPUNCTURE: CPT

## 2021-09-30 PROCEDURE — 85018 HEMOGLOBIN: CPT

## 2021-09-30 PROCEDURE — 70496 CT ANGIOGRAPHY HEAD: CPT | Mod: MA

## 2021-09-30 PROCEDURE — 70551 MRI BRAIN STEM W/O DYE: CPT | Mod: 26,MA

## 2021-09-30 PROCEDURE — 85610 PROTHROMBIN TIME: CPT

## 2021-09-30 PROCEDURE — 82553 CREATINE MB FRACTION: CPT

## 2021-09-30 PROCEDURE — 93010 ELECTROCARDIOGRAM REPORT: CPT

## 2021-09-30 PROCEDURE — 86900 BLOOD TYPING SEROLOGIC ABO: CPT

## 2021-09-30 PROCEDURE — U0005: CPT

## 2021-09-30 PROCEDURE — 84484 ASSAY OF TROPONIN QUANT: CPT

## 2021-09-30 RX ORDER — VALACYCLOVIR 500 MG/1
1 TABLET, FILM COATED ORAL
Qty: 21 | Refills: 0
Start: 2021-09-30 | End: 2021-10-06

## 2021-09-30 RX ORDER — ASPIRIN/CALCIUM CARB/MAGNESIUM 324 MG
81 TABLET ORAL ONCE
Refills: 0 | Status: COMPLETED | OUTPATIENT
Start: 2021-09-30 | End: 2021-09-30

## 2021-09-30 RX ORDER — VALACYCLOVIR 500 MG/1
1000 TABLET, FILM COATED ORAL ONCE
Refills: 0 | Status: COMPLETED | OUTPATIENT
Start: 2021-09-30 | End: 2021-09-30

## 2021-09-30 RX ADMIN — Medication 81 MILLIGRAM(S): at 10:01

## 2021-09-30 RX ADMIN — Medication 60 MILLIGRAM(S): at 12:59

## 2021-09-30 RX ADMIN — VALACYCLOVIR 1000 MILLIGRAM(S): 500 TABLET, FILM COATED ORAL at 12:58

## 2021-09-30 NOTE — ED PROVIDER NOTE - PHYSICAL EXAMINATION
Gen: well developed male NAD   HEENT: NCAT, EOMI, no nasal discharge, mucous membranes moist. no temporal tenderness b/l   CV: RRR, +S1/S2, no M/R/G  Resp: CTAB, no W/R/R  GI: Abdomen soft non-distended, NTTP, no masses  MSK: No open wounds, no bruising, no LE edema  Neuro: +R facial droop that spares R forehead, A&Ox4, MS +5/5 in UE and LE BL, finger to nose smooth and rapid, gross sensation intact in UE and LE BL, gait smooth and coordinated, negative pronator drift  Psych: appropriate mood

## 2021-09-30 NOTE — ED PROVIDER NOTE - NSFOLLOWUPINSTRUCTIONS_ED_ALL_ED_FT
Rest and stay well hydrated.  Take valacyclovir and prednisone as prescribed.  Follow-up with Neurology as outpatient for further evaluation of your facial weakness.  Return to the ED for any worsening weakness, numbness, change in mental status or any new concerning symptoms.

## 2021-09-30 NOTE — ED PROVIDER NOTE - NSFOLLOWUPCLINICS_GEN_ALL_ED_FT
[facility] - Primary Care  Primary Care  [address] [place] [address]  Phone: [phone]  Fax:     [facility]  Neurology  [address] [place] [id number]  Phone: [phone]  Fax:

## 2021-09-30 NOTE — ED PROVIDER NOTE - PROGRESS NOTE DETAILS
Ford, PGY3 - pt was reassessed, feels ok. MR negative for acute stroke. steve rodriguez spoke w/ neurology resident, recommend steroids/valacyclovir for early bells palsy. discussed results and plan with patient,  agrees with plan, all questions answered. Ford, PGY3 - pt was reassessed, feels ok. MR negative for acute stroke. steve rodriguez spoke w/ neurology resident, recommend steroids/valacyclovir for early bells palsy. discussed results and plan with patient,  agrees with plan, all questions answered.    Pt reevaluated and now with less ability to wrinkle right sided forehead.  Discussed results with patient and prescribed medications.  - Mary Rodriguez, DO

## 2021-09-30 NOTE — ED PROVIDER NOTE - CLINICAL SUMMARY MEDICAL DECISION MAKING FREE TEXT BOX
57 y/o M PMH kidney stones, HLD presents to ED c/o R facial droop onset yesterday. no other deficits. +R sided headache. no head trauma, no hx CVA in the past. concern for CVA, SAH, also considered complex migraine, tension headache. code stroke called from waiting room. plan labs CTs neuro consult

## 2021-09-30 NOTE — ED ADULT NURSE NOTE - OBJECTIVE STATEMENT
57yo M, pmh asthma. arrives from home for C/O weakness/numbness to R side of face, since yesterday morning , unclear what time. states he notices the sensation when he tries to drink liquids or eat. no droop noted. extremities strong, equal , speech clear and coherent. denies any hx of HTN, HLD, DM. aaox4. steady gait. was taken to ct from triage where ED and neuro team were waiting. assessment will be ongoing. not TPA candidate due to time window.

## 2021-09-30 NOTE — ED ADULT NURSE NOTE - CHPI ED NUR SYMPTOMS NEG
no blurred vision/no change in level of consciousness/no confusion/no dizziness/no fever/no loss of consciousness/no vomiting

## 2021-09-30 NOTE — ED PROVIDER NOTE - PATIENT PORTAL LINK FT
You can access the FollowMyHealth Patient Portal offered by [facility] by registering at the following website: [email or web address]. By joining [employer]’s FollowMyHealth portal, you will also be able to view your health information using other applications (apps) compatible with our system.

## 2021-09-30 NOTE — ED PROVIDER NOTE - NS_EDPROVIDERDISPOUSERTYPE_ED_A_ED
Preventive Care:    Diabetic Eye Exam Screening: During our visit today, we discussed that it is recommended you receive diabetic eye exam screening. Please call or make an appointment with your primary care provider to discuss this with them. You may also call the [facility] scheduling line ([phone]) to set up an eye exam at one of the [facility].       Attending Attestation (For Attendings USE Only)...

## 2021-09-30 NOTE — CONSULT NOTE ADULT - SUBJECTIVE AND OBJECTIVE BOX
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  He presents with right face droop, difficulty closing     (Stroke only)  NIHSS: 2  MRS: 0  LKN: unknown    REVIEW OF SYSTEMS  General:	  Skin/Breast:	  Ophthalmologic:  ENMT:	  Respiratory and Thorax:	  Cardiovascular:	  Gastrointestinal:	  Genitourinary:	  Musculoskeletal:	  Neurological:	  Psychiatric:	  Hematology/Lymphatics:	  Endocrine:	  Allergic/Immunologic:	    A 10-system ROS was performed and is negative except for those items noted above and/or in the HPI.    PAST MEDICAL & SURGICAL HISTORY:  Nephrolithiasis    BPH (benign prostatic hyperplasia)    Asthma    Hyperlipidemia  diet controlled    H/O nasal septoplasty  1990      FAMILY HISTORY:  FH: HTN (hypertension) (Mother)    FH: hyperlipidemia (Father)      SOCIAL HISTORY:   T/E/D:   Occupation:   Lives with:     MEDICATIONS (HOME):  Home Medications:  alfuzosin 10 mg oral tablet, extended release: 1 tab(s) orally once a day (04 Mar 2020 10:29)  Percocet 5/325 oral tablet: 1 tab(s) orally every 6 hours, As Needed (04 Mar 2020 11:58)  Symbicort 80 mcg-4.5 mcg/inh inhalation aerosol: 2 puff(s) inhaled 2 times a day, As Needed (04 Mar 2020 10:29)    MEDICATIONS  (STANDING):    MEDICATIONS  (PRN):    ALLERGIES/INTOLERANCES:  Allergies  latex (Anaphylaxis)  No Known Drug Allergies    Intolerances    VITALS & EXAMINATION:  Vital Signs Last 24 Hrs  T(C): 36.7 (30 Sep 2021 10:00), Max: 36.7 (30 Sep 2021 10:00)  T(F): 98 (30 Sep 2021 10:00), Max: 98 (30 Sep 2021 10:00)  HR: 86 (30 Sep 2021 10:00) (18 - 86)  BP: 148/78 (30 Sep 2021 10:00) (148/78 - 159/80)  BP(mean): --  RR: 17 (30 Sep 2021 10:00) (17 - 18)  SpO2: 100% (30 Sep 2021 10:00) (99% - 100%)    Neurological Exam    AOx3, no aphasia, no dysarthria  EOMI, VFF, v1-3 intact, right face droop, right eye difficulty closing  Motor: no drift x 4  Sensation: intact to LT x 4  Coordination: FNF and HTS no ataxia  No extinction or inattention        LABORATORY:  CBC                       14.6   6.18  )-----------( 173      ( 30 Sep 2021 09:38 )             44.9     Chem 09-30    139  |  103  |  16  ----------------------------<  138<H>  4.0   |  24  |  0.94    Ca    9.3      30 Sep 2021 09:38    TPro  7.1  /  Alb  4.5  /  TBili  0.6  /  DBili  x   /  AST  18  /  ALT  16  /  AlkPhos  76  09-30    LFTs LIVER FUNCTIONS - ( 30 Sep 2021 09:38 )  Alb: 4.5 g/dL / Pro: 7.1 g/dL / ALK PHOS: 76 U/L / ALT: 16 U/L / AST: 18 U/L / GGT: x           Coagulopathy PT/INR - ( 30 Sep 2021 09:38 )   PT: 12.8 sec;   INR: 1.07 ratio         PTT - ( 30 Sep 2021 09:38 )  PTT:31.0 sec  Lipid Panel   A1c   Cardiac enzymes CARDIAC MARKERS ( 30 Sep 2021 09:38 )  x     / x     / 123 U/L / x     / 1.9 ng/mL      U/A   CSF  Immunological  Other    STUDIES & IMAGING:   HPI:    56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  He presents with right face droop, difficulty closing right eye and right face droop developing over past 24 hours.  Exact start time unknown.  No headache, vertigo, vision changes, chest pain, SOB.    (Stroke only)  NIHSS: 2  MRS: 0  LKN: unknown    REVIEW OF SYSTEMS  General:	  Skin/Breast:	  Ophthalmologic:  ENMT:	  Respiratory and Thorax:	  Cardiovascular:	  Gastrointestinal:	  Genitourinary:	  Musculoskeletal:	  Neurological:	  Psychiatric:	  Hematology/Lymphatics:	  Endocrine:	  Allergic/Immunologic:	    A 10-system ROS was performed and is negative except for those items noted above and/or in the HPI.    PAST MEDICAL & SURGICAL HISTORY:  Nephrolithiasis    BPH (benign prostatic hyperplasia)    Asthma    Hyperlipidemia  diet controlled    H/O nasal septoplasty  1990      FAMILY HISTORY:  FH: HTN (hypertension) (Mother)    FH: hyperlipidemia (Father)      SOCIAL HISTORY:   T/E/D:   Occupation:   Lives with:     MEDICATIONS (HOME):  Home Medications:  alfuzosin 10 mg oral tablet, extended release: 1 tab(s) orally once a day (04 Mar 2020 10:29)  Percocet 5/325 oral tablet: 1 tab(s) orally every 6 hours, As Needed (04 Mar 2020 11:58)  Symbicort 80 mcg-4.5 mcg/inh inhalation aerosol: 2 puff(s) inhaled 2 times a day, As Needed (04 Mar 2020 10:29)    MEDICATIONS  (STANDING):    MEDICATIONS  (PRN):    ALLERGIES/INTOLERANCES:  Allergies  latex (Anaphylaxis)  No Known Drug Allergies    Intolerances    VITALS & EXAMINATION:  Vital Signs Last 24 Hrs  T(C): 36.7 (30 Sep 2021 10:00), Max: 36.7 (30 Sep 2021 10:00)  T(F): 98 (30 Sep 2021 10:00), Max: 98 (30 Sep 2021 10:00)  HR: 86 (30 Sep 2021 10:00) (18 - 86)  BP: 148/78 (30 Sep 2021 10:00) (148/78 - 159/80)  BP(mean): --  RR: 17 (30 Sep 2021 10:00) (17 - 18)  SpO2: 100% (30 Sep 2021 10:00) (99% - 100%)    Neurological Exam    AOx3, no aphasia, no dysarthria  EOMI, VFF, v1-3 intact, right face droop, right eye difficulty closing  Motor: no drift x 4  Sensation: intact to LT x 4  Coordination: FNF and HTS no ataxia  No extinction or inattention        LABORATORY:  CBC                       14.6   6.18  )-----------( 173      ( 30 Sep 2021 09:38 )             44.9     Chem 09-30    139  |  103  |  16  ----------------------------<  138<H>  4.0   |  24  |  0.94    Ca    9.3      30 Sep 2021 09:38    TPro  7.1  /  Alb  4.5  /  TBili  0.6  /  DBili  x   /  AST  18  /  ALT  16  /  AlkPhos  76  09-30    LFTs LIVER FUNCTIONS - ( 30 Sep 2021 09:38 )  Alb: 4.5 g/dL / Pro: 7.1 g/dL / ALK PHOS: 76 U/L / ALT: 16 U/L / AST: 18 U/L / GGT: x           Coagulopathy PT/INR - ( 30 Sep 2021 09:38 )   PT: 12.8 sec;   INR: 1.07 ratio         PTT - ( 30 Sep 2021 09:38 )  PTT:31.0 sec  Lipid Panel   A1c   Cardiac enzymes CARDIAC MARKERS ( 30 Sep 2021 09:38 )  x     / x     / 123 U/L / x     / 1.9 ng/mL      U/A   CSF  Immunological  Other    STUDIES & IMAGING:

## 2021-09-30 NOTE — CONSULT NOTE ADULT - ASSESSMENT
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  He presents with right face droop, difficulty closing right eye and right face droop developing over past 24 hours.  Exact start time unknown.  No headache, vertigo, vision changes, chest pain, SOB.  Exam notable for slow up going right forehard, and lower face droop.  CTH and MRI negative.  Impression: [place] palsy.        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  He presents with right face droop, difficulty closing right eye and right face droop developing over past 24 hours.  Exact start time unknown.  No headache, vertigo, vision changes, chest pain, SOB.  Exam notable for slow up going right forehard, and lower face droop.  CTH and MRI negative.  Impression: [place] palsy.    (Stroke only)  NIHSS: 2  MRS: 0  LKN: unknown    [] No further inpatient neurologic workup indicated. Patient is neurologically cleared for discharge.  [x] CTH/CTA H/N: unremarkable.  [x] MRI head unremarkable   [] Prednisone 60MG PO daily x 7days + Valtrex 1G PO TID x 7 days.  [] Eye patch (especially at night) to prevent corneal scratching, OTC natural tears for dry eye.  [] Patient counselled to return to the ED immediately for sxs on other side of face or body   [] Patient can follow up with general neurology at [address] [place] [address] 2-4 weeks after discharge. Please instruct the patient to call [phone] to schedule this appointment.

## 2021-09-30 NOTE — ED PROVIDER NOTE - OBJECTIVE STATEMENT
57 y/o M PMH kidney stones presents to ED c/o R facial droop / weakness onset yesterday. Denies focal weakness or numbness. Denies vision changes. Pt also reports R head pain in the temporal area radiation to R lateral neck yesterday. Denies f/c, cough, congestion. Denies hx stroke in the past.

## 2021-09-30 NOTE — ED PROVIDER NOTE - ATTENDING CONTRIBUTION TO CARE
I performed a history and physical exam of the patient and discussed their management with the resident.  I reviewed the resident's note and agree with the documented findings and plan of care except as noted below. My medical decision making and observations are as follows:    55 y/o M PMH kidney stones presents to ED c/o R facial droop / weakness onset yesterday. Denies focal weakness or numbness. Denies vision changes. Pt also reports R head pain in the temporal area radiation to R lateral neck yesterday. Pt with obvious right sided facial droop, no slurred speech.  Unable to close right eye.  5/5 strength and equal sensation b/l, normal gait.  Possible early bell's palsy vs cva.  Plan for code stroke labs, CT/CTAs and reassess.  - Mary Vilchis DO

## 2021-10-08 ENCOUNTER — APPOINTMENT (OUTPATIENT)
Dept: NEUROLOGY | Facility: CLINIC | Age: 56
End: 2021-10-08
Payer: COMMERCIAL

## 2021-10-08 VITALS
WEIGHT: 137 LBS | HEIGHT: 68 IN | DIASTOLIC BLOOD PRESSURE: 71 MMHG | SYSTOLIC BLOOD PRESSURE: 126 MMHG | HEART RATE: 70 BPM | BODY MASS INDEX: 20.76 KG/M2

## 2021-10-08 DIAGNOSIS — G51.0 BELL'S PALSY: ICD-10-CM

## 2021-10-08 PROCEDURE — 99215 OFFICE O/P EST HI 40 MIN: CPT

## 2021-10-09 RX ORDER — OXYCODONE AND ACETAMINOPHEN 5; 325 MG/1; MG/1
5-325 TABLET ORAL
Qty: 30 | Refills: 0 | Status: DISCONTINUED | COMMUNITY
Start: 2020-03-04 | End: 2021-10-09

## 2021-10-09 RX ORDER — RIFAMPIN 300 MG/1
300 CAPSULE ORAL DAILY
Qty: 60 | Refills: 3 | Status: DISCONTINUED | COMMUNITY
Start: 2018-02-06 | End: 2021-10-09

## 2021-10-09 RX ORDER — ALFUZOSIN HYDROCHLORIDE 10 MG/1
10 TABLET, EXTENDED RELEASE ORAL DAILY
Refills: 0 | Status: ACTIVE | COMMUNITY

## 2021-10-09 NOTE — HISTORY OF PRESENT ILLNESS
[FreeTextEntry1] : 56-year-old man noted onset of some right mastoid discomfort followed the next day by right facial weakness.  He was seen by neurologists at the [facility] emergency room on September 30 and diagnosed with a right Bell's palsy.  He completed 1 week of valacyclovir and prednisone that was prescribed and has noted some improvement.  When he arrived at the emergency room a stroke code was called and he had a normal CT scan of the head and normal MRI of brain.

## 2021-10-09 NOTE — ASSESSMENT
[FreeTextEntry1] : HIs right-sided Bell's palsy is mild at this stage and I anticipate continued improvement.  We will obtain a Lyme titer.  Dr. Hopkins will call me in 6 weeks

## 2021-10-09 NOTE — PHYSICAL EXAM
[FreeTextEntry1] : No cognitive or communication deficits.  He has very mild weakness of the right orbicularis oculi and oris.  There is no hyperacusis and no ageusia.  Tympanic membrane AD glistening.  No coordination deficits.  Remainder of neurologic examination is normal.

## 2021-11-05 LAB

## 2021-11-12 ENCOUNTER — NON-APPOINTMENT (OUTPATIENT)
Age: 56
End: 2021-11-12

## 2021-12-13 ENCOUNTER — TRANSCRIPTION ENCOUNTER (OUTPATIENT)
Age: 56
End: 2021-12-13

## 2022-10-31 ENCOUNTER — APPOINTMENT (OUTPATIENT)
Dept: PULMONOLOGY | Facility: CLINIC | Age: 57
End: 2022-10-31

## 2022-10-31 VITALS
OXYGEN SATURATION: 95 % | DIASTOLIC BLOOD PRESSURE: 71 MMHG | HEART RATE: 77 BPM | WEIGHT: 148 LBS | TEMPERATURE: 98 F | BODY MASS INDEX: 22.43 KG/M2 | SYSTOLIC BLOOD PRESSURE: 128 MMHG | RESPIRATION RATE: 15 BRPM | HEIGHT: 68 IN

## 2022-10-31 DIAGNOSIS — R06.83 SNORING: ICD-10-CM

## 2022-10-31 PROCEDURE — 99203 OFFICE O/P NEW LOW 30 MIN: CPT

## 2022-10-31 RX ORDER — MOMETASONE FUROATE 1 MG/G
0.1 CREAM TOPICAL
Qty: 45 | Refills: 0 | Status: DISCONTINUED | COMMUNITY
Start: 2020-03-02 | End: 2022-10-31

## 2022-10-31 RX ORDER — BUDESONIDE AND FORMOTEROL FUMARATE DIHYDRATE 160; 4.5 UG/1; UG/1
AEROSOL RESPIRATORY (INHALATION)
Refills: 0 | Status: ACTIVE | COMMUNITY

## 2022-10-31 RX ORDER — CLOBETASOL PROPIONATE 0.5 MG/G
CREAM TOPICAL
Refills: 0 | Status: DISCONTINUED | COMMUNITY
End: 2022-10-31

## 2022-10-31 RX ORDER — TADALAFIL 5 MG/1
5 TABLET, FILM COATED ORAL
Qty: 30 | Refills: 0 | Status: DISCONTINUED | COMMUNITY
Start: 2017-10-12 | End: 2022-10-31

## 2022-10-31 NOTE — PHYSICAL EXAM
[Low Lying Soft Palate] : low lying soft palate [Elongated Uvula] : elongated uvula [Enlarged Base of the Tongue] : enlargement of the base of the tongue [IV] : IV [Normal Appearance] : normal appearance [Well Groomed] : well groomed [General Appearance - In No Acute Distress] : no acute distress [Normal Conjunctiva] : the conjunctiva exhibited no abnormalities [Neck Circumference: ___] : neck circumference is [unfilled] [Heart Rate And Rhythm] : heart rate was normal and rhythm regular [Murmurs] : no murmurs [] : no respiratory distress [Auscultation Breath Sounds / Voice Sounds] : lungs were clear to auscultation bilaterally [Involuntary Movements] : no involuntary movements were seen [No Focal Deficits] : no focal deficits [Affect] : the affect was normal [Mood] : the mood was normal [FreeTextEntry2] : no edema

## 2022-10-31 NOTE — HISTORY OF PRESENT ILLNESS
[Obstructive Sleep Apnea] : obstructive sleep apnea [Snoring] : snoring [Frequent Nocturnal Awakening] : frequent nocturnal awakening [Awakening With Dry Mouth] : awakening with dry mouth [Recent  Weight Gain] : recent weight gain [To Bed: ___] : ~he/she~ goes to bed at [unfilled] [Nocturnal Awakenings: ___] : ~he/she~ typically has [unfilled] nocturnal awakenings [WASO: ___] : Wake time after sleep onset is [unfilled] [TST: ___] : Total sleep time is [unfilled] [Daytime Sleep: ___] : daytime sleep: [unfilled] [Arises: ___] : arises at [unfilled] [Sleep Onset Latency: ___ minutes] : sleep onset latency of [unfilled] minutes reported [FreeTextEntry1] : 57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  Also mild fatigue and lack of sleep, attributed to his occupation.  10-lb weight gain over the past year.  \par \par Apnea-related symptoms:  Disruptive snoring, awakens with dry mouth.  Awakens twice briefly for nocturia, stress, or increased snoring awareness.  Denies daytime somnolence, insomnia, parasomnia, and RLS symptoms.\par \par SLEEP:  7-hours on average.  Bedtime after 11pm.   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  Occasional 2-hour nap on the weekend. \par \par OCCUPATION:  OB/GYN physician.  Office hours 4-days/week, on call twice per week.\par \par SOCIAL HX:  Alcohol socially.  Consumes 1 caffeinated coffee qam, +/- 1 tea in the evening.  No tobacco or illicit drug use.  \par \par SURGICAL HX:   Nasal septoplasty.   R rotator cuff repair.\par FAMILY HX:  HTN, breast CA (mother).  Snoring (father).  No sleep disorder.  \par \par EPWORTH SLEEPINESS SCALE\par \par How likely are you to doze off or fall asleep in the situations described below, in contrast to feeling just tired?  This refers to your usual way of life in recent times.  Even if you haven't done some of these things recently, try to work out how they would have affected you.  Use the following scale to choose one most appropriate number for each situation.......Chance of dozin= never. 1= slight. 2= moderate. 3= high.\par \par CHANCE OF DOZING............SITUATION\par 1.............................................Sitting and reading\par 0.............................................Watching TV\par 0.............................................Sitting inactive in a public place (eg a theatre or a meeting)\par 0.............................................As a passenger in a car for an hour without a break\par 1.............................................Lying down to rest in the afternoon when circumstances permit\par 0.............................................Sitting and talking to someone\par 0.............................................Sitting quietly after lunch without alcohol\par 0.............................................In a car, while stopped for a few minutes in traffic\par \par 2............................................TOTAL ESS SCORE [Witnessed Apneas] : no witnessed sleep apnea [Unintentional Sleep while Active] : no unintentional sleep while active [Unintentional Sleep While Inactive] : no unintentional sleep while inactive [Awakes Unrefreshed] : does not awake unrefreshed [Awakes with Headache] : no headache upon awakening [Daytime Somnolence] : no daytime somnolence [DIS] : no DIS [DMS] : no DMS [Unusual Sleep Behavior] : no unusual sleep behavior [Lower Extremity Discomfort] : no lower extremity discomfort in evening or at bedtime [ESS] : 2

## 2022-10-31 NOTE — REVIEW OF SYSTEMS
[Fatigue] : fatigue [Negative] : Psychiatric [FreeTextEntry8] : Asthma, cold weather induced.  Nasal septoplasty 1989. Winter nasal congestion.  Asthma has improved for past 3-years.   [de-identified] : 2021 (-) Lyme disease, head CT and brain MRI. [FreeTextEntry7] : mild diverticulosis [FreeTextEntry5] : BPH.  Nephrolithiasis (passed) [FreeTextEntry6] : R rotator cuff surgery 3/2020.

## 2022-10-31 NOTE — ASSESSMENT
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 \par \par The patient's sleep-disordered breathing related signs and symptoms include:  disruptive snoring, awakening with dry mouth, awakening for nocturia twice, mild fatigue, and crowded oropharynx with FTP IV.  \par \par The patient was referred to the [facility] for a diagnostic polysomnographic sleep study.  He will follow up by phone 2-weeks after the sleep study if he has not been contacted by then.\par \par

## 2023-01-12 ENCOUNTER — APPOINTMENT (OUTPATIENT)
Dept: SLEEP CENTER | Facility: CLINIC | Age: 58
End: 2023-01-12
Payer: COMMERCIAL

## 2023-01-12 ENCOUNTER — OUTPATIENT (OUTPATIENT)
Dept: OUTPATIENT SERVICES | Facility: HOSPITAL | Age: 58
LOS: 1 days | End: 2023-01-12
Payer: COMMERCIAL

## 2023-01-12 DIAGNOSIS — Z98.890 OTHER SPECIFIED POSTPROCEDURAL STATES: Chronic | ICD-10-CM

## 2023-01-12 PROCEDURE — 95800 SLP STDY UNATTENDED: CPT | Mod: 26

## 2023-01-12 PROCEDURE — 95800 SLP STDY UNATTENDED: CPT

## 2023-01-30 ENCOUNTER — APPOINTMENT (OUTPATIENT)
Dept: SLEEP CENTER | Facility: CLINIC | Age: 58
End: 2023-01-30

## 2023-02-16 DIAGNOSIS — G47.33 OBSTRUCTIVE SLEEP APNEA (ADULT) (PEDIATRIC): ICD-10-CM

## 2025-01-13 NOTE — ED PROVIDER NOTE - ATTENDING WITH...
Adult ADHD Clinic Orientation Seminar   Orientation/Psychoeducation    Patient's attendance: Attended in Full     Seminar/Group Focus: ADHD Clinic Orientation Seminar  Target symptoms: ADHD    Site: [facility]  Clinical status of patient: Outpatient  No LOS. Billing Provider and Co-signer: Marie Ocasio, PhD (see signature below)  Length of Service: 35 minutes    Seminar/Group Topic:  Behavioral Health  Seminar/Group Department: [address]  Seminar/Group Facilitators: Kiley Garcia LMSW  # of patients: 12    Interval history: Patient presented for the Adult ADHD Clinic orientation seminar. The seminar provided information regarding guidelines and structure of assessment, diagnosis, and treatment in this clinic.  Diagnosis:   1. History of attention deficit hyperactivity disorder (ADHD)  Ambulatory referral/consult to Psychiatry        Patient's response: Accepting  Progress toward goals and other mental status changes: As expected    Plan: Patient will indicate whether to proceed with the Adult ADHD Clinic pre-screen  Return to clinic: as scheduled            Marie Ocasio, PhD  Clinical Psychologist        
Resident